# Patient Record
Sex: MALE | Race: WHITE | NOT HISPANIC OR LATINO | Employment: FULL TIME | URBAN - METROPOLITAN AREA
[De-identification: names, ages, dates, MRNs, and addresses within clinical notes are randomized per-mention and may not be internally consistent; named-entity substitution may affect disease eponyms.]

---

## 2017-04-24 ENCOUNTER — OFFICE VISIT (OUTPATIENT)
Dept: FAMILY MEDICINE | Facility: CLINIC | Age: 22
End: 2017-04-24
Payer: COMMERCIAL

## 2017-04-24 VITALS
BODY MASS INDEX: 32.06 KG/M2 | SYSTOLIC BLOOD PRESSURE: 140 MMHG | OXYGEN SATURATION: 98 % | DIASTOLIC BLOOD PRESSURE: 84 MMHG | HEIGHT: 72 IN | WEIGHT: 236.7 LBS | RESPIRATION RATE: 16 BRPM | TEMPERATURE: 97.9 F | HEART RATE: 80 BPM

## 2017-04-24 DIAGNOSIS — G43.009 MIGRAINE WITHOUT AURA AND WITHOUT STATUS MIGRAINOSUS, NOT INTRACTABLE: ICD-10-CM

## 2017-04-24 DIAGNOSIS — G44.309 POST-CONCUSSION HEADACHE: Primary | ICD-10-CM

## 2017-04-24 PROCEDURE — 99214 OFFICE O/P EST MOD 30 MIN: CPT | Performed by: FAMILY MEDICINE

## 2017-04-24 RX ORDER — SUMATRIPTAN 50 MG/1
50 TABLET, FILM COATED ORAL
Qty: 9 TABLET | Refills: 3 | Status: SHIPPED | OUTPATIENT
Start: 2017-04-24 | End: 2017-05-26

## 2017-04-24 ASSESSMENT — PAIN SCALES - GENERAL: PAINLEVEL: NO PAIN (0)

## 2017-04-24 NOTE — PROGRESS NOTES
SUBJECTIVE:                                                    Tj Mobley is a 21 year old male who presents to clinic today for the following health issues:      Headache- history of migraines. Seem to be getting worse. Varies with caffeine use. Had a head injury 2 weeks ago playing Lacrosse then fell again last week landing on his back. Wants to know if this could have contributed to his headaches.      Onset: couple weeks    Description:   Location: bilateral in the frontal area, bilateral in the temporal area, bilateral in the occipital area   Character: throbbing pain, sharp pain, squeezing pain, global  Frequency:  daily  Duration:  2 hours    Intensity: mild, severe    Progression of Symptoms:  worsening    Accompanying Signs & Symptoms:  Stiff neck: YES  Neck or upper back pain: YES- back pain  Fever: no  Sinus pressure: YES  Nausea or vomiting: YES  Dizziness: YES  Numbness: no  Weakness: YES - fatigued  Visual changes: YES   History:   Head trauma: yes- ball hit malissa 2 weeks ago and fell on back last week.   Family history of migraines: YES  Previous tests for headaches: no  Neurologist evaluations: no  Able to do daily activities: YES  Wake with a headaches: YES  Do headaches wake you up: YES- occasional  Daily pain medication use: YES- Tylenol  Work/school stressors/changes: no    Precipitating factors:   Does light make it worse: YES  Does sound make it worse: YES    Alleviating factors:  Does sleep help: YES         Therapies Tried and outcome: Tylenol    Work noon to 6-8 pm. Bed at 1 pm up at 10-11 am. Never noticed triggers unless he is very sleep deprived.       Migraine Follow-Up    Headaches symptoms:  As above    Frequency: once every few weeks     Duration of headaches: 1-2 days    Able to do normal daily activities/work with migraines: Yes    Rescue/Relief medication:ibuprofen (Advil, Motrin) and Tylenol              Effectiveness: moderate relief    Preventative medication:  None    Neurologic complications: No new stroke-like symptoms, loss of vision or speech, numbness or weakness    In the past 4 weeks, how often have you gone to Urgent Care or the emergency room because of your headaches?  0       Problem list and histories reviewed & adjusted, as indicated.  Additional history: as documented    Patient Active Problem List   Diagnosis     Obesity peds (BMI >=95 percentile)     CARDIOVASCULAR SCREENING; LDL GOAL LESS THAN 160     History reviewed. No pertinent surgical history.    Social History   Substance Use Topics     Smoking status: Passive Smoke Exposure - Never Smoker     Smokeless tobacco: Never Used     Alcohol use No     History reviewed. No pertinent family history.      Current Outpatient Prescriptions   Medication Sig Dispense Refill     albuterol (PROAIR HFA) 108 (90 BASE) MCG/ACT inhaler Inhale 2 puffs into the lungs every 6 hours as needed. Use prior to exercise/allergy/URI symptoms. 1 Inhaler 3     [DISCONTINUED] albuterol (PROAIR HFA, PROVENTIL HFA, VENTOLIN HFA) 108 (90 BASE) MCG/ACT inhaler Inhale 2 puffs into the lungs every 6 hours as needed for shortness of breath / dyspnea or wheezing 1 Inhaler 1     Allergies   Allergen Reactions     Penicillins      Sulfa Drugs      Recent Labs   Lab Test  03/15/16   1101   LDL  10   HDL  56   TRIG  132*      BP Readings from Last 3 Encounters:   04/24/17 140/84   10/10/16 128/65   03/15/16 129/80    Wt Readings from Last 3 Encounters:   04/24/17 236 lb 11.2 oz (107.4 kg) (99 %)*   10/10/16 238 lb 6.4 oz (108.1 kg) (99 %)*   03/15/16 240 lb (108.9 kg) (99 %)*     * Growth percentiles are based on CDC 2-20 Years data.                  Labs reviewed in EPIC    Reviewed and updated as needed this visit by clinical staff  Tobacco  Allergies  Meds  Med Hx  Surg Hx  Fam Hx  Soc Hx      Reviewed and updated as needed this visit by Provider         ROS:  Constitutional, HEENT, cardiovascular, pulmonary, gi and gu systems are  negative, except as otherwise noted.    OBJECTIVE:                                                    /84 (BP Location: Right arm, Patient Position: Right side, Cuff Size: Adult Regular)  Pulse 80  Temp 97.9  F (36.6  C) (Oral)  Resp 16  Ht 6' (1.829 m)  Wt 236 lb 11.2 oz (107.4 kg)  SpO2 98%  BMI 32.1 kg/m2  Body mass index is 32.1 kg/(m^2).  GENERAL: healthy, alert, well nourished, well hydrated, no distress, obese  HENT: ear canals- normal; TMs- normal; Nose- normal; Mouth- no ulcers, no lesions, throat is clear with no erythema or exudate.   NECK: no tenderness, no adenopathy, no asymmetry, no masses, no stiffness; thyroid- normal to palpation  RESP: lungs clear to auscultation - no rales, no rhonchi, no wheezes  CV: regular rates and rhythm, normal S1 S2, no S3 or S4 and no murmur, no click or rub -  ABDOMEN: soft, no tenderness, no  hepatosplenomegaly, no masses, normal bowel sounds  MS: extremities- no gross deformities noted, no edema  SKIN: no suspicious lesions, no rashes  NEURO: strength and tone- normal, sensory exam- grossly normal, mentation- intact, speech- normal, reflexes- symmetric  BACK: no CVA tenderness, no paralumbar tenderness  PSYCH: Alert and oriented times 3; speech- coherent , normal rate and volume; able to articulate logical thoughts, able to abstract reason, no tangential thoughts, no hallucinations or delusions, affect- normal           ASSESSMENT/PLAN:                                                        Tobacco Cessation:   reports that he is a non-smoker but has been exposed to tobacco smoke. He has never used smokeless tobacco.      BMI:   Estimated body mass index is 32.1 kg/(m^2) as calculated from the following:    Height as of this encounter: 6' (1.829 m).    Weight as of this encounter: 236 lb 11.2 oz (107.4 kg).   Weight management plan: Discussed healthy diet and exercise guidelines and patient will follow up in 3 months in clinic to re-evaluate.         ICD-10-CM    1. Post-concussion headache G44.309 Discussed risks of head concussion and headache symptoms. No contact sports or Lacrosse for the next months or until headaches are completely resolved. At risk for repeat concussion.    2. Migraine without aura and without status migrainosus, not intractable G43.009 SUMAtriptan (IMITREX) 50 MG tablet- one to two as needed for headaches. Discussed triggers and lifestyle issues. Will go over Migraine action next visit or follow up with primary care provider.       CONSULTATION/REFERRAL to neurology if not improving. Also discussed when to go to emergency department if headaches or symptoms are worse.  FUTURE LABS:       - Schedule fasting labs in a year, then see provider  FUTURE APPOINTMENTS:       - Follow-up visit in 1-2 months or sooner if any questions or concerns.   Work on weight loss  Regular exercise  See Patient Instructions    Ruby Sim MD  [facility]

## 2017-04-24 NOTE — LETTER
[address] [address]  Phone: [phone]    April 24, 2017        Tj Mobley  [address] [place] [place] [address]          To whom it may concern:    RE: Tj Mobley    Patient was seen and treated today at our clinic and missed work 4/24/2017. May go back to work 4-.    Please contact me for questions or concerns.      Sincerely,        Ruby Sim MD

## 2017-04-24 NOTE — NURSING NOTE
Chief Complaint   Patient presents with     Pain     stomach, head       Initial /84 (BP Location: Right arm, Patient Position: Right side, Cuff Size: Adult Regular)  Pulse 80  Temp 97.9  F (36.6  C) (Oral)  Resp 16  Ht 6' (1.829 m)  Wt 236 lb 11.2 oz (107.4 kg)  SpO2 98%  BMI 32.1 kg/m2 Estimated body mass index is 32.1 kg/(m^2) as calculated from the following:    Height as of this encounter: 6' (1.829 m).    Weight as of this encounter: 236 lb 11.2 oz (107.4 kg).  Medication Reconciliation: complete     Jay Abdi MA

## 2017-04-24 NOTE — PATIENT INSTRUCTIONS
What Is Traumatic Brain Injury?  A traumatic brain injury (TBI) is a sudden jolt to your head that changes the way your brain works. The jolt could be caused by a blow to your head, a blast, or an object like a bullet or fragment entering your brain. Falls, fights, combat, sports, and motor vehicle accidents are other common causes. TBI happens more often in men than women and more often under the age of 25.   Types of TBI  A TBI can be mild, moderate, or severe. Most TBIs are mild. Some medical groups refer to a mild TBI as a concussion, while other groups view concussion as a milder subset of TBI. If you have a mild TBI, you might be knocked out for a short time or you might just feel stunned for a while. With a moderate or severe TBI, the duration of loss of consciousness would be longer. Your healthcare team will decide the severity of the TBI based on the symptoms at the time of the trauma as well as afterwards. Often the Kecia Coma Scale (GCS) is used as one tool to classify the severity of TBI. Symptoms of TBI are unpredictable since you could have a mild TBI and actually have more persistent symptoms than someone with a more severe TBI.   Symptoms of TBI  Having a TBI can change your brain in many ways. A TBI can change the way you think, feel, act, and move. The symptoms depend on the part of your brain that is injured. Common symptoms of mild TBI can include:    Headache    Confusion    Loss of consciousness     Dizziness    Blurry vision    Ringing in your ears    Feeling tired    Loss of memory (both before and after the injury)     Mood changes    Trouble sleeping    Being off balance    Being bothered by bright light    Feeling sick to your stomach  Symptoms of moderate or severe TBI may include all the symptoms of a mild TBI as well as any or all of these symptoms:    Extended loss of consciousness     Severe headache that does not go away    Repeated vomiting    Seizures    Extreme  sleepiness    Slurred speech    Weakness and numbness in your arms and legs    Being very clumsy    Being very irritable, restless, or confused  Recovering from TBI  Most people recover completely from a mild TBI. It may take days or weeks. If you have had more than one TBI, your recovery may take longer. Everyone s brain is different, so your recovery time and treatments will depend on how your brain is healing. Here are some tips to help your recovery:    Be honest with your healthcare team and let them know about all your symptoms.    Let your healthcare provider know right away if your symptoms are getting worse or new symptoms appear.     Make sure to keep all your appointments and follow your healthcare provider's instructions carefully.    Give your brain time to heal. Be patient and get plenty of rest.    Don t smoke, take drugs, or drink alcohol.    Don t take any medicines without checking with your healthcare provider first. This includes over-the-counter medicines.    Your healthcare provider might suggest that you take a new medicine (a combination of dextromethorphan and quinidine) if it is difficult controlling your emotions, such as laughing and crying uncontrollably.     Symptoms and recovery from a TBI are unpredictable and are different from person to person. Most people do recover completely from most TBIs. Remember that a TBI can change the way you think, feel, move, and act. The best way to recover is to let your family and healthcare team know about all your symptoms. Work closely with your healthcare team and give your brain time to heal.    8944-9094 [employer]. [address] [place] [address]. All rights reserved. This information is not intended as a substitute for professional medical care. Always follow your healthcare professional's instructions.        Preventing Migraine Headaches: Triggers  The first step in preventing migraines is to learn what triggers  them. You may then be able to control your triggers to avoid or reduce the severity of your migraines.     Know your triggers  Be aware that you may have more than one trigger, and that some triggers may work together. Common migraine triggers include:    Food and nutrition. Skipping meals or not drinking enough water can trigger headaches. So can certain foods, such as caffeine, monosodium glutamate (MSG), aged cheese, or sausage.    Alcohol. Red wine and other alcoholic beverages are common migraine triggers.    Chemicals. Scents, cleaning products, gasoline, glue, perfume, and paint can be triggers. So can tobacco smoke, including secondhand smoke.    Emotions. Stress can trigger headaches or make them worse once they begin.    Sleep disruption. Staying up late, sleeping late, and traveling across time zones can disrupt your sleep cycle, triggering headaches.    Hormones. Many women notice that migraines tend to happen at a certain point in their menstrual cycle. Birth control pills or hormone replacement therapy may also trigger migraines.    Environment and weather. Air travel, changes in altitude, air pressure changes, hot sun, or bright or flashing lights can be triggers.    Control your triggers  These are some of the things you can do to try to control triggers:    Avoid triggers if you can. For example, stay clear of alcohol and foods that trigger your headaches. Use unscented household products. Keep regular sleep habits. Manage stress to help control emotional triggers.    Change your behavior at times when triggers can't be avoided. For example, make sure to get enough rest and drink plenty of water while you're traveling. Make sure to carry a hat, sunglasses, and your medicines. Be alert for migraine symptoms, so you can treat a migraine early if it happens.    1798-2908 [employer]. [address] [place] [id number]. All rights reserved. This information is not intended as a  substitute for professional medical care. Always follow your healthcare professional's instructions.

## 2017-04-24 NOTE — MR AVS SNAPSHOT
After Visit Summary   4/24/2017    Tj Mobley    MRN: [id number]           Patient Information     Date Of Birth          1995        Visit Information        Provider Department      4/24/2017 4:40 PM Ruby Sim MD [facility]        Today's Diagnoses     Post-concussion headache    -  1    Migraine without aura and without status migrainosus, not intractable          Care Instructions      What Is Traumatic Brain Injury?  A traumatic brain injury (TBI) is a sudden jolt to your head that changes the way your brain works. The jolt could be caused by a blow to your head, a blast, or an object like a bullet or fragment entering your brain. Falls, fights, combat, sports, and motor vehicle accidents are other common causes. TBI happens more often in men than women and more often under the age of 25.   Types of TBI  A TBI can be mild, moderate, or severe. Most TBIs are mild. Some medical groups refer to a mild TBI as a concussion, while other groups view concussion as a milder subset of TBI. If you have a mild TBI, you might be knocked out for a short time or you might just feel stunned for a while. With a moderate or severe TBI, the duration of loss of consciousness would be longer. Your healthcare team will decide the severity of the TBI based on the symptoms at the time of the trauma as well as afterwards. Often the Kecia Coma Scale (GCS) is used as one tool to classify the severity of TBI. Symptoms of TBI are unpredictable since you could have a mild TBI and actually have more persistent symptoms than someone with a more severe TBI.   Symptoms of TBI  Having a TBI can change your brain in many ways. A TBI can change the way you think, feel, act, and move. The symptoms depend on the part of your brain that is injured. Common symptoms of mild TBI can include:    Headache    Confusion    Loss of consciousness     Dizziness    Blurry vision    Ringing in your  ears    Feeling tired    Loss of memory (both before and after the injury)     Mood changes    Trouble sleeping    Being off balance    Being bothered by bright light    Feeling sick to your stomach  Symptoms of moderate or severe TBI may include all the symptoms of a mild TBI as well as any or all of these symptoms:    Extended loss of consciousness     Severe headache that does not go away    Repeated vomiting    Seizures    Extreme sleepiness    Slurred speech    Weakness and numbness in your arms and legs    Being very clumsy    Being very irritable, restless, or confused  Recovering from TBI  Most people recover completely from a mild TBI. It may take days or weeks. If you have had more than one TBI, your recovery may take longer. Everyone s brain is different, so your recovery time and treatments will depend on how your brain is healing. Here are some tips to help your recovery:    Be honest with your healthcare team and let them know about all your symptoms.    Let your healthcare provider know right away if your symptoms are getting worse or new symptoms appear.     Make sure to keep all your appointments and follow your healthcare provider's instructions carefully.    Give your brain time to heal. Be patient and get plenty of rest.    Don t smoke, take drugs, or drink alcohol.    Don t take any medicines without checking with your healthcare provider first. This includes over-the-counter medicines.    Your healthcare provider might suggest that you take a new medicine (a combination of dextromethorphan and quinidine) if it is difficult controlling your emotions, such as laughing and crying uncontrollably.     Symptoms and recovery from a TBI are unpredictable and are different from person to person. Most people do recover completely from most TBIs. Remember that a TBI can change the way you think, feel, move, and act. The best way to recover is to let your family and healthcare team know about all your  symptoms. Work closely with your healthcare team and give your brain time to heal.    9238-4874 [employer]. [address] [place] [address], [place] [address]. All rights reserved. This information is not intended as a substitute for professional medical care. Always follow your healthcare professional's instructions.        Preventing Migraine Headaches: Triggers  The first step in preventing migraines is to learn what triggers them. You may then be able to control your triggers to avoid or reduce the severity of your migraines.     Know your triggers  Be aware that you may have more than one trigger, and that some triggers may work together. Common migraine triggers include:    Food and nutrition. Skipping meals or not drinking enough water can trigger headaches. So can certain foods, such as caffeine, monosodium glutamate (MSG), aged cheese, or sausage.    Alcohol. Red wine and other alcoholic beverages are common migraine triggers.    Chemicals. Scents, cleaning products, gasoline, glue, perfume, and paint can be triggers. So can tobacco smoke, including secondhand smoke.    Emotions. Stress can trigger headaches or make them worse once they begin.    Sleep disruption. Staying up late, sleeping late, and traveling across time zones can disrupt your sleep cycle, triggering headaches.    Hormones. Many women notice that migraines tend to happen at a certain point in their menstrual cycle. Birth control pills or hormone replacement therapy may also trigger migraines.    Environment and weather. Air travel, changes in altitude, air pressure changes, hot sun, or bright or flashing lights can be triggers.    Control your triggers  These are some of the things you can do to try to control triggers:    Avoid triggers if you can. For example, stay clear of alcohol and foods that trigger your headaches. Use unscented household products. Keep regular sleep habits. Manage stress to help control emotional  "triggers.    Change your behavior at times when triggers can't be avoided. For example, make sure to get enough rest and drink plenty of water while you're traveling. Make sure to carry a hat, sunglasses, and your medicines. Be alert for migraine symptoms, so you can treat a migraine early if it happens.    4942-8198 The [employer]. [address] [place] [address]. All rights reserved. This information is not intended as a substitute for professional medical care. Always follow your healthcare professional's instructions.              Follow-ups after your visit        Who to contact     If you have questions or need follow up information about today's clinic visit or your schedule please contact [facility] directly at [phone].  Normal or non-critical lab and imaging results will be communicated to you by MyChart, letter or phone within 4 business days after the clinic has received the results. If you do not hear from us within 7 days, please contact the clinic through MyChart or phone. If you have a critical or abnormal lab result, we will notify you by phone as soon as possible.  Submit refill requests through [facility] or call your pharmacy and they will forward the refill request to us. Please allow 3 business days for your refill to be completed.          Additional Information About Your Visit        [employer] Information     [facility] lets you send messages to your doctor, view your test results, renew your prescriptions, schedule appointments and more. To sign up, go to [email or web address] . Click on \"Log in\" on the left side of the screen, which will take you to the Welcome page. Then click on \"Sign up Now\" on the right side of the page.     You will be asked to enter the access code listed below, as well as some personal information. Please follow the directions to create your username and password.     Your access code is: [email or web address]  Expires: 7/23/2017  5:30 " PM     Your access code will  in 90 days. If you need help or a new code, please call your [facility] or [phone].        Care EveryWhere ID     This is your Care EveryWhere ID. This could be used by other organizations to access your [place] medical records  [id number]        Your Vitals Were     Pulse Temperature Respirations Height Pulse Oximetry BMI (Body Mass Index)    80 97.9  F (36.6  C) (Oral) 16 6' (1.829 m) 98% 32.1 kg/m2       Blood Pressure from Last 3 Encounters:   17 140/84   10/10/16 128/65   03/15/16 129/80    Weight from Last 3 Encounters:   17 236 lb 11.2 oz (107.4 kg) (99 %)*   10/10/16 238 lb 6.4 oz (108.1 kg) (99 %)*   03/15/16 240 lb (108.9 kg) (99 %)*     * Growth percentiles are based on [facility] 2-20 Years data.              Today, you had the following     No orders found for display         Today's Medication Changes          These changes are accurate as of: 17  5:30 PM.  If you have any questions, ask your nurse or doctor.               Start taking these medicines.        Dose/Directions    SUMAtriptan 50 MG tablet   Commonly known as:  IMITREX   Used for:  Migraine without aura and without status migrainosus, not intractable   Started by:  Ruby Sim MD        Dose:  50 mg   Take 1 tablet (50 mg) by mouth at onset of headache for migraine May repeat in 2 hours. Max 4 tablets/24 hours.   Quantity:  9 tablet   Refills:  3            Where to get your medicines      These medications were sent to [facility] [id number]  [address] [place] - [address] AT [facility] [address]  [address] [place] [address]     Phone:  [phone]     SUMAtriptan 50 MG tablet                Primary Care Provider Office Phone # Fax #    Lobo Berman [phone]       [facility] [address]  [facility] [place] [address]        Thank you!     Thank you for choosing [facility]  for your care. Our  goal is always to provide you with excellent care. Hearing back from our patients is one way we can continue to improve our services. Please take a few minutes to complete the written survey that you may receive in the mail after your visit with us. Thank you!             Your Updated Medication List - Protect others around you: Learn how to safely use, store and throw away your medicines at www.disposemymeds.org.          This list is accurate as of: 4/24/17  5:30 PM.  Always use your most recent med list.                   Brand Name Dispense Instructions for use    albuterol 108 (90 BASE) MCG/ACT Inhaler   Generic drug:  albuterol     1 Inhaler    Inhale 2 puffs into the lungs every 6 hours as needed. Use prior to exercise/allergy/URI symptoms.       SUMAtriptan 50 MG tablet    IMITREX    9 tablet    Take 1 tablet (50 mg) by mouth at onset of headache for migraine May repeat in 2 hours. Max 4 tablets/24 hours.

## 2017-05-26 ENCOUNTER — OFFICE VISIT (OUTPATIENT)
Dept: FAMILY MEDICINE | Facility: CLINIC | Age: 22
End: 2017-05-26
Payer: COMMERCIAL

## 2017-05-26 VITALS
TEMPERATURE: 97.8 F | HEIGHT: 72 IN | SYSTOLIC BLOOD PRESSURE: 131 MMHG | OXYGEN SATURATION: 98 % | DIASTOLIC BLOOD PRESSURE: 79 MMHG | BODY MASS INDEX: 31.97 KG/M2 | HEART RATE: 102 BPM | WEIGHT: 236 LBS

## 2017-05-26 DIAGNOSIS — E66.811 OBESITY (BMI 30.0-34.9): ICD-10-CM

## 2017-05-26 DIAGNOSIS — G43.009 MIGRAINE WITHOUT AURA AND WITHOUT STATUS MIGRAINOSUS, NOT INTRACTABLE: ICD-10-CM

## 2017-05-26 DIAGNOSIS — F32.1 MAJOR DEPRESSIVE DISORDER, SINGLE EPISODE, MODERATE (H): Primary | ICD-10-CM

## 2017-05-26 DIAGNOSIS — Z23 NEED FOR HPV VACCINATION: ICD-10-CM

## 2017-05-26 PROCEDURE — 90651 9VHPV VACCINE 2/3 DOSE IM: CPT | Performed by: NURSE PRACTITIONER

## 2017-05-26 PROCEDURE — 90471 IMMUNIZATION ADMIN: CPT | Performed by: NURSE PRACTITIONER

## 2017-05-26 PROCEDURE — 99214 OFFICE O/P EST MOD 30 MIN: CPT | Mod: 25 | Performed by: NURSE PRACTITIONER

## 2017-05-26 RX ORDER — CLONAZEPAM 0.5 MG/1
0.25-0.5 TABLET ORAL 2 TIMES DAILY PRN
Qty: 20 TABLET | Refills: 0 | Status: SHIPPED | OUTPATIENT
Start: 2017-05-26 | End: 2018-04-04

## 2017-05-26 ASSESSMENT — PAIN SCALES - GENERAL: PAINLEVEL: NO PAIN (0)

## 2017-05-26 NOTE — NURSING NOTE
Chief Complaint   Patient presents with     Anxiety     Depression       Initial /79  Pulse 102  Temp 97.8  F (36.6  C) (Oral)  Ht 6' (1.829 m)  Wt 236 lb (107 kg)  SpO2 98%  BMI 32.01 kg/m2 Estimated body mass index is 32.01 kg/(m^2) as calculated from the following:    Height as of this encounter: 6' (1.829 m).    Weight as of this encounter: 236 lb (107 kg).  Medication Reconciliation: deanne Gibson MA

## 2017-05-26 NOTE — LETTER
[address] [address]  [phone]    May 26, 2017        Tj Mobley  [address] [place] [address]          To whom it may concern:    This patient missed work  5/26/2017 due to a clinic visit.  He can return to work tomorrow.    Please contact me for questions or concerns.        Sincerely,        Aimee SANTOS, CNP

## 2017-05-26 NOTE — LETTER
My Migraine Action Plan      Date: 5/26/2017     My Name: Tj Mobley   YOB: 1995  My Pharmacy: [facility] [id number] [place], [place] [id number] [facility]  [address]       My (Preventative) Control Medicine:         My Rescue Medicine: Excedrin migraine   My Doctor: Lobo Berman     My Clinic: [address] [id number] [phone]        GREEN ZONE = Good Control    My headache plan is working.   I can do what I need to do.           I WILL:     ? Keep managing my triggers.  ? Write in my migraine diary each time I have a headache.  ? Keep taking my preventive (controller) medicine daily.  ? Take my relief and rescue medicine as needed.             YELLOW ZONE = Not Enough Control    My headache plan isn t always working.   My headaches keep me from doing   some of the things I need to do.       I WILL:     ? Set goals to control my triggers and act on them.  ? Write in my migraine diary each time I have a headache and review it for                      patterns or new triggers.  ? Keep taking my preventive (controller) medicine daily.  ? Take my relief and rescue medicine as needed.  ? Call my doctor or clinic at if I stay in the Yellow Zone.             RED ZONE = Poor or No Control    My headache plan has  failed. I can t do anything  when I have one. My  medicines aren t working.           I WILL:   ? Set goals to control my triggers and act on them.  ? Write in my migraine diary each time I have a headache and review it for                      patterns or new triggers.  ? Keep taking my preventive (controller) medicine daily.  ? Take my relief and rescue medicine as needed.  ? Call my doctor or clinic or go to urgent care or an ER if I m having the worst                  headache of my life.  ? Call my doctor or clinic or go to urgent care or an ER if my medicine doesn t work.  ? Let my doctor or clinic know within  2 weeks if I have gone to an urgent care or             emergency department.          Provider specific instructions:

## 2017-05-26 NOTE — NURSING NOTE
Screening Questionnaire for Adult Immunization    Are you sick today?   No   Do you have allergies to medications, food, a vaccine component or latex?   No   Have you ever had a serious reaction after receiving a vaccination?   No   Do you have a long-term health problem with heart disease, lung disease, asthma, kidney disease, metabolic disease (e.g. diabetes), anemia, or other blood disorder?   No   Do you have cancer, leukemia, HIV/AIDS, or any other immune system problem?   No   In the past 3 months, have you taken medications that affect  your immune system, such as prednisone, other steroids, or anticancer drugs; drugs for the treatment of rheumatoid arthritis, Crohn s disease, or psoriasis; or have you had radiation treatments?   No   Have you had a seizure, or a brain or other nervous system problem?   No   During the past year, have you received a transfusion of blood or blood     products, or been given immune (gamma) globulin or antiviral drug?   No   For women: Are you pregnant or is there a chance you could become        pregnant during the next month?   No   Have you received any vaccinations in the past 4 weeks?   No     Immunization questionnaire answers were all negative.      MNVFC doesn't apply on this patient    Per orders of Aimee Dunlap CNP, injection of HPV given by Irma Cornejo. Patient instructed to remain in clinic for 20 minutes afterwards, and to report any adverse reaction to me immediately.       Screening performed by Irma Cornejo on 5/26/2017 at 5:21 PM.

## 2017-05-26 NOTE — PATIENT INSTRUCTIONS
At [facility], we strive to deliver an exceptional experience to you, every time we see you.    If you receive a survey in the mail, please send us back your thoughts. We really do value your feedback.    Thank you for visiting [facility]    Normal or non-critical lab and imaging results will be communicated to you by MyChart, letter or phone within 7 days.  If you do not hear from us within 10 days, please call the clinic. If you have a critical or abnormal lab result, we will notify you by phone as soon as possible.     If you have any questions regarding your visit please contact:     Team [facility]  Clinic Hours Telephone Number   Dr. Jovani Martinez   7am-7pm  Monday through Thursday  7am-5pm Friday [phone]  Tatiana HARRIS RN   Pharmacy 8:30am-9pm Monday-Friday    9am-5pm Saturday-Sunday [phone]   Urgent Care 11am-9pm Monday-Friday        9am-5pm Saturday-Sunday [phone]     After hours, weekend or if you need to make an appointment with your primary provider please call [phone].   After Hours nurse advise: call [place] [facility]: [phone]    Use [employer] (secure email communication and access to your chart) to send your primary care provider a message or make an appointment. Ask someone on your Team how to sign up for [employer]. To log on to [employer] or for more information in [employer] please visit the website at [email or web address].   As of October 8, 2013, all password changes, disabled accounts, or ID changes in [employer]/MyHealth will be done by our Access Services Department.   If you need help with your account or password, call: [phone]. Clinic staff no longer has the ability to change passwords.     Depression: Tips to Help Yourself  As your health care providers help treat your depression, you can also help yourself. Keep  in mind that your illness affects you emotionally, physically, mentally, and socially. So full recovery will take time. Take care of your body and your soul, and be patient with yourself as you get better.    Be with others  Don t isolate yourself you ll only feel worse. Try to be with other people. And take part in fun activities when you can. Go to a movie, ballgame, Shinto service, or social event. Talk openly with people you can trust. And accept help when it s offered.  Keep your perspective    Depression can cloud your judgment. So wait until you feel better before making major life decisions, such as changing jobs, moving, or getting  or .    This illness is not your fault. Don t blame yourself for your depression.    Recovering from depression is a process. Don t be discouraged if it takes some time to feel better.    Depression saps your energy and concentration. So you won t be able to do all the things you used to do. Set small goals and do what you can.  Take care of your body  People with depression often lose the desire to take care of themselves. That only makes their problems worse. During treatment and afterward, make a point to:    Exercise. It s a great way to take care of your body. And studies have shown that exercise helps fight depression.    Avoid drugs and alcohol. These may ease the pain in the short term. But they ll only make your problems worse in the long run.    Get relief from stress. Ask your healthcare provider for relaxation exercises and techniques to help relieve stress.    Eat right. A balanced and healthy diet helps keep your body healthy.    6035-5288 The [employer]. [address] [place] [address], [place] [address]. All rights reserved. This information is not intended as a substitute for professional medical care. Always follow your healthcare professional's instructions.        Counseling for Depression  Counseling, also called talk therapy, has been  found to be as effective as medication in treating mild to moderate depression. When done by a trained professional, this treatment is a powerful way to better understand your thoughts and feelings. Like medications, it may take time before you notice how much counseling is helping.    Kinds of talk therapy  Different counselors use different methods for talk therapy. But all therapy aims to help change how you think about your problem. Therapy for depression is often done one-on-one. But it may also be done in a group setting. You and your healthcare provider can discuss the type of therapy you think would work best for you. You can also discuss who the best person is to provide the therapy.  How therapy helps  Talking about your problems can help them seem less overwhelming. It can help work through problems you have with your life and your relationships. It can also help you understand how depression is clouding your thinking, not letting you see the world the way it really is. Therapy can give you:    Insight about your emotions.    New tools for dealing with your problems.    Emotional support for making progress.  Getting better takes time  Talk therapy will help you feel better. But change doesn t happen right away. Depression takes away your energy and motivation. So it can be hard to feel like going to therapy and sticking with it. But therapy has been proven to be very valuable in the treatment of depression. Therapy for depression is often done for a set number of sessions. In other cases, you and your therapist decide together at what point you no longer need therapy.  Additional sources of help  In addition to a professional counselor, it may help to talk to other people in your life. You may find support and insight from:    A close friend or family member.    A , , or rabbi trained in counseling.    A local support group or community group.    A 12-step program (such as [employer]) for dealing with problems that can contribute to depression, such as alcohol or drug addiction.    1710-2977 The [employer]. [address] [place] [address], [place] [address]. All rights reserved. This information is not intended as a substitute for professional medical care. Always follow your healthcare professional's instructions.

## 2017-05-26 NOTE — PROGRESS NOTES
SUBJECTIVE:                                                    Tj Mobley is a 21 year old male who presents to clinic today for the following health issues:      Abnormal Mood Symptoms      Duration: x 2 weeks    Description:  Depression: YES  Anxiety: YES  Panic attacks: YES     Accompanying signs and symptoms: see PHQ-9 and ANITHA scores    History (similar episodes/previous evaluation): None    Precipitating or alleviating factors: None    Therapies tried and outcome: Ativan (Lorezepam)   Patient had prescription for #20 Ativan 1/21/176 for anxiety.  His ex girlfriend has started back to work with him at [email or web address], has had issues with co workers.  He has initial insomnia (1-2 hours), has frequent awakenings, daytime somnolence, he snores but denies apneic spells.  He denies history of depression- he dropped all of his classes after he broke up with his girlfriend (her choice) in February.  He has fleeting thoughts of suicide but denies plan or intent.  He moved out of his parents home, lived on his own but had financial concerns, is now living with his Mother again.  He drinks alcohol occasionally- usually 1-2 drinks at a time of eigther Vodka or beer, denies illicit drug use, no supplement use.    Migraines- due for MAP.  He continues to get headaches following concussion sustained in April 2017.  Headaches occur in frontal and temporal region, sometimes in occipital region and last 102 hours, relieved with rest and Excedrin Migraine.  Lack of sleep is a trigger for migraine for him and he is not sleeping well now.  No visual changes, weakness, dizziness, balance problems/abnormal gait.  He is nto taking Imitrex, states Excedrine migraine works beter for him- using it 2-3 times/week.    Problem list and histories reviewed & adjusted, as indicated.  Additional history: as documented    BP Readings from Last 3 Encounters:   05/26/17 131/79   04/24/17 140/84   10/10/16 128/65    Wt Readings from Last 3 Encounters:    05/26/17 236 lb (107 kg) (99 %)*   04/24/17 236 lb 11.2 oz (107.4 kg) (99 %)*   10/10/16 238 lb 6.4 oz (108.1 kg) (99 %)*     * Growth percentiles are based on CDC 2-20 Years data.                  Labs reviewed in EPIC    Reviewed and updated as needed this visit by clinical staff  Tobacco  Allergies  Meds       Reviewed and updated as needed this visit by Provider         ROS:  Constitutional, HEENT, cardiovascular, pulmonary, gi and gu systems are negative, except as otherwise noted.    OBJECTIVE:                                                    /79  Pulse 102  Temp 97.8  F (36.6  C) (Oral)  Ht 6' (1.829 m)  Wt 236 lb (107 kg)  SpO2 98%  BMI 32.01 kg/m2  Body mass index is 32.01 kg/(m^2).  GENERAL: healthy, alert and no distress  EYES: Eyes grossly normal to inspection, PERRL and conjunctivae and sclerae normal  HENT: ear canals and TM's normal, nose and mouth without ulcers or lesions  NECK: no adenopathy, no asymmetry, masses, or scars and thyroid normal to palpation  RESP: lungs clear to auscultation - no rales, rhonchi or wheezes  CV: regular rate and rhythm, normal S1 S2, no S3 or S4, no murmur, click or rub, no peripheral edema and peripheral pulses strong  MS: no gross musculoskeletal defects noted, no edema  SKIN: no suspicious lesions or rashes  PSYCH: mentation appears normal, affect normal/bright    Diagnostic Test Results:  none      ASSESSMENT/PLAN:                                                        BP Screening:   Last 3 BP Readings:    BP Readings from Last 3 Encounters:   05/26/17 131/79   04/24/17 140/84   10/10/16 128/65       The following was recommended to the patient:  Re-screen BP within a year and recommended lifestyle modifications  BMI:   Estimated body mass index is 32.01 kg/(m^2) as calculated from the following:    Height as of this encounter: 6' (1.829 m).    Weight as of this encounter: 236 lb (107 kg).   Weight management plan: Discussed healthy diet and  exercise guidelines and patient will follow up in 12 months in clinic to re-evaluate.      1. Major depressive disorder, single episode, moderate (H)  Referring for counseling as he's struggled with depression for several months, dealing with break up, getting along with co workers.  We'll start Zololft and use Klonopin prn for anxiety/panic attacks until he is therapeutic on the Zoloft. Discussed need for gradual increase of SSRI dose over time, titrating to effect.  Reviewed potential for initial side effects (such as headache, GI symptoms, and dry mouth) that will likely subside after a week or so, but that therapeutic effects will likely take 1-2 weeks - so it's important to stick with medication for at least a month to adequately gauge effect.  Notify me of any significant side effects.  Discussed that treatment with SSRI medications requires a minimum commitment of 9-12 months; shorter courses are associated with rebound symptoms.  Discussed potential long-term side effects including sexual side effects. Return to clinic 3 weeks for medication check, sooner if concerns.    - MENTAL HEALTH REFERRAL  - sertraline (ZOLOFT) 50 MG tablet; Take 1 tablet (50 mg) by mouth daily  Dispense: 30 tablet; Refill: 1  - clonazePAM (KLONOPIN) 0.5 MG tablet; Take 0.5-1 tablets (0.25-0.5 mg) by mouth 2 times daily as needed for anxiety  Dispense: 20 tablet; Refill: 0  - TSH with free T4 reflex; Future    2. Need for HPV vaccination    - HUMAN PAPILLOMA VIRUS (GARDASIL 9) VACCINE  - VACCINE ADMINISTRATION, INITIAL    3. Migraine without aura and without status migrainosus, not intractable  MAP reviewed.  Cautioned patient on use of NSAIDS for more than 3 days/week to avoid rebound headaches.  He is to keep headache log which we'll review at his next visit.    4. Obesity (BMI 30.0-34.9)  Benefits of weight loss reviewed in detail, encouraged him to cut back on the carbohydrates in the diet, consume more fruits and vegetables,  drink plenty of water, avoid fruit juices, sodas, get 150 min moderate exercise/week.  Recheck weight in 6 months.        See Patient Instructions    DANIELLE Aly [facility]

## 2017-05-26 NOTE — MR AVS SNAPSHOT
After Visit Summary   5/26/2017    Tj Mobley    MRN: [id number]           Patient Information     Date Of Birth          1995        Visit Information        Provider Department      5/26/2017 3:40 PM Aimee Dunlap APRN CNP [facility]        Today's Diagnoses     Major depressive disorder, single episode, moderate (H)    -  1    Need for HPV vaccination        Migraine without aura and without status migrainosus, not intractable          Care Instructions    At [facility], we strive to deliver an exceptional experience to you, every time we see you.    If you receive a survey in the mail, please send us back your thoughts. We really do value your feedback.    Thank you for visiting [facility]    Normal or non-critical lab and imaging results will be communicated to you by MyChart, letter or phone within 7 days.  If you do not hear from us within 10 days, please call the clinic. If you have a critical or abnormal lab result, we will notify you by phone as soon as possible.     If you have any questions regarding your visit please contact:     Team Amie/Spirit  Clinic Hours Telephone Number   Dr. Jovani Martinez   7am-7pm  Monday through Thursday  7am-5pm Friday [phone]  Tatiana HARRIS RN   Pharmacy 8:30am-9pm Monday-Friday    9am-5pm Saturday-Sunday [phone]   Urgent Care 11am-9pm Monday-Friday        9am-5pm Saturday-Sunday [phone]     After hours, weekend or if you need to make an appointment with your primary provider please call [phone].   After Hours nurse advise: call [place] [facility]: [phone]    Use Quantaporet (secure email communication and access to your chart) to send your primary care provider a message or make an appointment. Ask someone on your Team how to sign up for Eye-Fi. To log on  to AisleFinder or for more information in [place] please visit the website at [email or web address].   As of October 8, 2013, all password changes, disabled accounts, or ID changes in Incuron/MyHealth will be done by our Access Services Department.   If you need help with your account or password, call: [phone]. Clinic staff no longer has the ability to change passwords.     Depression: Tips to Help Yourself  As your health care providers help treat your depression, you can also help yourself. Keep in mind that your illness affects you emotionally, physically, mentally, and socially. So full recovery will take time. Take care of your body and your soul, and be patient with yourself as you get better.    Be with others  Don t isolate yourself--you ll only feel worse. Try to be with other people. And take part in fun activities when you can. Go to a movie, [employer], Nondenominational service, or social event. Talk openly with people you can trust. And accept help when it s offered.  Keep your perspective    Depression can cloud your judgment. So wait until you feel better before making major life decisions, such as changing jobs, moving, or getting  or .    This illness is not your fault. Don t blame yourself for your depression.    Recovering from depression is a process. Don t be discouraged if it takes some time to feel better.    Depression saps your energy and concentration. So you won t be able to do all the things you used to do. Set small goals and do what you can.  Take care of your body  People with depression often lose the desire to take care of themselves. That only makes their problems worse. During treatment and afterward, make a point to:    Exercise. It s a great way to take care of your body. And studies have shown that exercise helps fight depression.    Avoid drugs and alcohol. These may ease the pain in the short term. But they ll only make your problems worse in the long run.    Get  relief from stress. Ask your healthcare provider for relaxation exercises and techniques to help relieve stress.    Eat right. A balanced and healthy diet helps keep your body healthy.    0467-6434 [employer]. [address] [place] [address], [place] [address]. All rights reserved. This information is not intended as a substitute for professional medical care. Always follow your healthcare professional's instructions.        Counseling for Depression  Counseling, also called talk therapy, has been found to be as effective as medication in treating mild to moderate depression. When done by a trained professional, this treatment is a powerful way to better understand your thoughts and feelings. Like medications, it may take time before you notice how much counseling is helping.    Kinds of talk therapy  Different counselors use different methods for talk therapy. But all therapy aims to help change how you think about your problem. Therapy for depression is often done one-on-one. But it may also be done in a group setting. You and your healthcare provider can discuss the type of therapy you think would work best for you. You can also discuss who the best person is to provide the therapy.  How therapy helps  Talking about your problems can help them seem less overwhelming. It can help work through problems you have with your life and your relationships. It can also help you understand how depression is clouding your thinking, not letting you see the world the way it really is. Therapy can give you:    Insight about your emotions.    New tools for dealing with your problems.    Emotional support for making progress.  Getting better takes time  Talk therapy will help you feel better. But change doesn t happen right away. Depression takes away your energy and motivation. So it can be hard to feel like going to therapy and sticking with it. But therapy has been proven to be very valuable in the treatment of  depression. Therapy for depression is often done for a set number of sessions. In other cases, you and your therapist decide together at what point you no longer need therapy.  Additional sources of help  In addition to a professional counselor, it may help to talk to other people in your life. You may find support and insight from:    A close friend or family member.    A , , or rabbi trained in counseling.    A local support group or community group.    A 12-step program (such as Alcoholics Anonymous) for dealing with problems that can contribute to depression, such as alcohol or drug addiction.    1966-1628 [employer]. [address] [place] [address]. All rights reserved. This information is not intended as a substitute for professional medical care. Always follow your healthcare professional's instructions.                Follow-ups after your visit        Additional Services     MENTAL HEALTH REFERRAL       Your provider has referred you to: [facility]: [facility] - Counseling (Individual/Couples/Family) - [facility] [phone]   [email or web address]/   *Patient will be contacted by [place]'s scheduling partner, [facility] ([facility]), to schedule an appointment.  Patients may also call [facility] to schedule.    All scheduling is subject to the client's specific insurance plan & benefits, provider/location availability, and provider clinical specialities.  Please arrive 15 minutes early for your first appointment and bring your completed paperwork.    Please be aware that coverage of these services is subject to the terms and limitations of your health insurance plan.  Call member services at your health plan with any benefit or coverage questions.                  Who to contact     If you have questions or need follow up information about today's clinic visit or your schedule  "please contact [facility] directly at [phone].  Normal or non-critical lab and imaging results will be communicated to you by MyChart, letter or phone within 4 business days after the clinic has received the results. If you do not hear from us within 7 days, please contact the clinic through MyChart or phone. If you have a critical or abnormal lab result, we will notify you by phone as soon as possible.  Submit refill requests through Sendmebox or call your pharmacy and they will forward the refill request to us. Please allow 3 business days for your refill to be completed.          Additional Information About Your Visit        PantechharRockYou Information     Sendmebox lets you send messages to your doctor, view your test results, renew your prescriptions, schedule appointments and more. To sign up, go to [email or web address] . Click on \"Log in\" on the left side of the screen, which will take you to the Welcome page. Then click on \"Sign up Now\" on the right side of the page.     You will be asked to enter the access code listed below, as well as some personal information. Please follow the directions to create your username and password.     Your access code is: [email or web address]  Expires: 2017  5:30 PM     Your access code will  in 90 days. If you need help or a new code, please call your [facility] clinic or [phone].        Care EveryWhere ID     This is your Care EveryWhere ID. This could be used by other organizations to access your [facility] medical records  [id number]        Your Vitals Were     Pulse Temperature Height Pulse Oximetry BMI (Body Mass Index)       102 97.8  F (36.6  C) (Oral) 6' (1.829 m) 98% 32.01 kg/m2        Blood Pressure from Last 3 Encounters:   17 131/79   17 140/84   10/10/16 128/65    Weight from Last 3 Encounters:   17 236 lb (107 kg) (99 %)*   17 236 lb 11.2 oz (107.4 kg) (99 %)*   10/10/16 238 lb 6.4 oz (108.1 kg) (99 %)*     * " Growth percentiles are based on [facility] 2-20 Years data.              We Performed the Following     HUMAN PAPILLOMA VIRUS (GARDASIL 9) VACCINE     MENTAL HEALTH REFERRAL     MIGRAINE ACTION PLAN          Today's Medication Changes          These changes are accurate as of: 5/26/17  5:15 PM.  If you have any questions, ask your nurse or doctor.               Start taking these medicines.        Dose/Directions    clonazePAM 0.5 MG tablet   Commonly known as:  klonoPIN   Used for:  Major depressive disorder, single episode, moderate (H)   Started by:  Aimee Dunlap APRN CNP        Dose:  0.25-0.5 mg   Take 0.5-1 tablets (0.25-0.5 mg) by mouth 2 times daily as needed for anxiety   Quantity:  20 tablet   Refills:  0       sertraline 50 MG tablet   Commonly known as:  ZOLOFT   Used for:  Major depressive disorder, single episode, moderate (H)   Started by:  Aimee Dunlap APRN CNP        Dose:  50 mg   Take 1 tablet (50 mg) by mouth daily   Quantity:  30 tablet   Refills:  1         Stop taking these medicines if you haven't already. Please contact your care team if you have questions.     albuterol 108 (90 BASE) MCG/ACT Inhaler   Generic drug:  albuterol   Stopped by:  Aimee Dunlap APRN CNP           SUMAtriptan 50 MG tablet   Commonly known as:  IMITREX   Stopped by:  Aimee Dunlap APRN CNP                Where to get your medicines      [facility] [address] [place] [address] [address] [facility] [address]  [address] [address] [place] [address]     Phone:  [phone]     sertraline 50 MG tablet         Some of these will need a paper prescription and others can be bought over the counter.  Ask your nurse if you have questions.     Bring a paper prescription for each of these medications     clonazePAM 0.5 MG tablet                Primary Care Provider Office Phone # Fax #    Lobo Berman [phone]        [facility] [address]  [facility] [address]        Thank you!     Thank you for choosing [facility]  for your care. Our goal is always to provide you with excellent care. Hearing back from our patients is one way we can continue to improve our services. Please take a few minutes to complete the written survey that you may receive in the mail after your visit with us. Thank you!             Your Updated Medication List - Protect others around you: Learn how to safely use, store and throw away your medicines at www.disposemymeds.org.          This list is accurate as of: 5/26/17  5:15 PM.  Always use your most recent med list.                   Brand Name Dispense Instructions for use    clonazePAM 0.5 MG tablet    klonoPIN    20 tablet    Take 0.5-1 tablets (0.25-0.5 mg) by mouth 2 times daily as needed for anxiety       sertraline 50 MG tablet    ZOLOFT    30 tablet    Take 1 tablet (50 mg) by mouth daily

## 2017-05-30 ASSESSMENT — ANXIETY QUESTIONNAIRES
3. WORRYING TOO MUCH ABOUT DIFFERENT THINGS: NEARLY EVERY DAY
5. BEING SO RESTLESS THAT IT IS HARD TO SIT STILL: NEARLY EVERY DAY
6. BECOMING EASILY ANNOYED OR IRRITABLE: NEARLY EVERY DAY
1. FEELING NERVOUS, ANXIOUS, OR ON EDGE: NEARLY EVERY DAY
2. NOT BEING ABLE TO STOP OR CONTROL WORRYING: MORE THAN HALF THE DAYS
7. FEELING AFRAID AS IF SOMETHING AWFUL MIGHT HAPPEN: NEARLY EVERY DAY
GAD7 TOTAL SCORE: 20

## 2017-05-30 ASSESSMENT — PATIENT HEALTH QUESTIONNAIRE - PHQ9: 5. POOR APPETITE OR OVEREATING: NEARLY EVERY DAY

## 2017-05-31 ASSESSMENT — ANXIETY QUESTIONNAIRES: GAD7 TOTAL SCORE: 20

## 2017-05-31 ASSESSMENT — PATIENT HEALTH QUESTIONNAIRE - PHQ9: SUM OF ALL RESPONSES TO PHQ QUESTIONS 1-9: 21

## 2017-08-23 ENCOUNTER — OFFICE VISIT (OUTPATIENT)
Dept: URGENT CARE | Facility: URGENT CARE | Age: 22
End: 2017-08-23
Payer: COMMERCIAL

## 2017-08-23 VITALS
WEIGHT: 223.4 LBS | TEMPERATURE: 98.1 F | BODY MASS INDEX: 30.3 KG/M2 | SYSTOLIC BLOOD PRESSURE: 126 MMHG | HEART RATE: 58 BPM | OXYGEN SATURATION: 97 % | DIASTOLIC BLOOD PRESSURE: 80 MMHG

## 2017-08-23 DIAGNOSIS — R07.0 THROAT PAIN: Primary | ICD-10-CM

## 2017-08-23 DIAGNOSIS — J06.9 VIRAL UPPER RESPIRATORY TRACT INFECTION: ICD-10-CM

## 2017-08-23 LAB
DEPRECATED S PYO AG THROAT QL EIA: NORMAL
SPECIMEN SOURCE: NORMAL

## 2017-08-23 PROCEDURE — 87081 CULTURE SCREEN ONLY: CPT | Performed by: NURSE PRACTITIONER

## 2017-08-23 PROCEDURE — 99213 OFFICE O/P EST LOW 20 MIN: CPT | Performed by: NURSE PRACTITIONER

## 2017-08-23 PROCEDURE — 87880 STREP A ASSAY W/OPTIC: CPT | Performed by: NURSE PRACTITIONER

## 2017-08-23 RX ORDER — FLUTICASONE PROPIONATE 50 MCG
1-2 SPRAY, SUSPENSION (ML) NASAL DAILY
Qty: 1 BOTTLE | Refills: 0 | Status: SHIPPED | OUTPATIENT
Start: 2017-08-23 | End: 2017-09-06

## 2017-08-23 RX ORDER — CETIRIZINE HYDROCHLORIDE 10 MG/1
10 TABLET ORAL EVERY EVENING
Qty: 14 TABLET | Refills: 0 | Status: SHIPPED | OUTPATIENT
Start: 2017-08-23 | End: 2017-09-06

## 2017-08-23 NOTE — LETTER
[facility]  [address]  [facility] [address]  Phone: [phone]    August 23, 2017        Tj Mobley  [address] [place] [address]          To whom it may concern:    RE: Tj Mobley    Patient was seen and treated today at our clinic and missed work.  Patient may return to work 8/24/2017 with no restrictions.    Please contact me for questions or concerns.      Sincerely,        Melanie Ortiz NP

## 2017-08-23 NOTE — PATIENT INSTRUCTIONS
When You Have a Sore Throat    A sore throat can be painful. There are many reasons why you may have a sore throat. Your healthcare provider will work with you to find the cause of your sore throat. He or she will also find the best treatment for you.  What causes a sore throat?  Sore throats can be caused or worsened by:    Cold or flu viruses    Bacteria    Irritants such as tobacco smoke or air pollution    Acid reflux  A healthy throat  The tonsils are on the sides of the throat near the base of the tongue. They collect viruses and bacteria and help fight infection. The throat (pharynx) is the passage for air. Mucus from the nasal cavity also moves down the passage.  An inflamed throat  The tonsils and pharynx can become inflamed due to a cold or flu virus. Postnasal drip (excess mucus draining from the nasal cavity) can irritate the throat. It can also make the throat or tonsils more likely to be infected by bacteria. Severe, untreated tonsillitis in children or adults can cause a pocket of pus (abscess) to form near the tonsil.  Your evaluation  A medical evaluation can help find the cause of your sore throat. It can also help your healthcare provider choose the best treatment for you. The evaluation may include a health history, physical exam, and diagnostic tests.  Health history  Your healthcare provider may ask you the following:    How long has the sore throat lasted and how have you been treating it?    Do you have any other symptoms, such as body aches, fever, or cough?    Does your sore throat recur? If so, how often? How many days of school or work have you missed because of a sore throat?    Do you have trouble eating or swallowing?    Have you been told that you snore or have other sleep problems?    Do you have bad breath?    Do you cough up bad-tasting mucus?  Physical exam  During the exam, your healthcare provider checks your ears, nose, and throat for problems. He or she also checks for  "swelling in the neck, and may listen to your chest.  Possible tests  Other tests your healthcare provider may perform include:    A throat swab to check for bacteria such as streptococcus (the bacteria that causes strep throat)    A blood test to check for mononucleosis (a viral infection)    A chest X-ray to rule out pneumonia, especially if you have a cough  Treating a sore throat  Treatment depends on many factors. What is the likely cause? Is the problem recent? Does it keep coming back? In many cases, the best thing to do is to treat the symptoms, rest, and let the problem heal itself. Antibiotics may help clear up some bacterial infections. For cases of severe or recurring tonsillitis, the tonsils may need to be removed.  Relieving your symptoms    Don t smoke, and avoid secondhand smoke.    For children, try throat sprays or Popsicles. Adults and older children may try lozenges.    Drink warm liquids to soothe the throat and help thin mucus. Avoid alcohol, spicy foods, and acidic drinks such as orange juice. These can irritate the throat.    Gargle with warm saltwater (1 teaspoon of salt to 8 ounces of warm water).    Use a humidifier to keep air moist and relieve throat dryness.    Try over-the-counter pain relievers such as acetaminophen or ibuprofen. Use as directed, and don t exceed the recommended dose. Don t give aspirin to children.   Are antibiotics needed?  If your sore throat is due to a bacterial infection, antibiotics may speed healing and prevent complications. Although group A streptococcus (\"strep throat\" or GAS) is the major treatable infection for a sore throat, GAS causes only 5% to 15% of sore throats in adults who seek medical care. Most sore throats are caused by cold or flu viruses. And antibiotics don t treat viral illness. In fact, using antibiotics when they re not needed may produce bacteria that are harder to kill. Your healthcare provider will prescribe antibiotics only if he or " she thinks they are likely to help.  If antibiotics are prescribed  Take the medicine exactly as directed. Be sure to finish your prescription even if you re feeling better. And be sure to ask your healthcare provider or pharmacist what side effects are common and what to do about them.  Is surgery needed?  In some cases, tonsils need to be removed. This is often done as outpatient (same-day) surgery. Your healthcare provider may advise removing the tonsils in cases of:    Several severe bouts of tonsillitis in a year.  Severe  episodes include those that lead to missed days of school or work, or that need to be treated with antibiotics.    Tonsillitis that causes breathing problems during sleep    Tonsillitis caused by food particles collecting in pouches in the tonsils (cryptic tonsillitis)  Call your healthcare provider if any of the following occur:    Symptoms worsen, or new symptoms develop.    Swollen tonsils make breathing difficult.    The pain is severe enough to keep you from drinking liquids.    A skin rash, hives, or wheezing develops. Any of these could signal an allergic reaction to antibiotics.    Symptoms don t improve within a week.    Symptoms don t improve within 2 to 3 days of starting antibiotics.   Date Last Reviewed: 10/1/2016    9108-3777 [employer]. [address] [place] [address]. All rights reserved. This information is not intended as a substitute for professional medical care. Always follow your healthcare professional's instructions.        Viral Upper Respiratory Illness (Adult)  You have a viral upper respiratory illness (URI), which is another term for the common cold. This illness is contagious during the first few days. It is spread through the air by coughing and sneezing. It may also be spread by direct contact (touching the sick person and then touching your own eyes, nose, or mouth). Frequent handwashing will decrease risk of spread. Most viral  illnesses go away within 7 to 10 days with rest and simple home remedies. Sometimes the illness may last for several weeks. Antibiotics will not kill a virus, and they are generally not prescribed for this condition.    Home care    If symptoms are severe, rest at home for the first 2 to 3 days. When you resume activity, don't let yourself get too tired.    Avoid being exposed to cigarette smoke (yours or others ).    You may use acetaminophen or ibuprofen to control pain and fever, unless another medicine was prescribed. (Note: If you have chronic liver or kidney disease, have ever had a stomach ulcer or gastrointestinal bleeding, or are taking blood-thinning medicines, talk with your healthcare provider before using these medicines.) Aspirin should never be given to anyone under 18 years of age who is ill with a viral infection or fever. It may cause severe liver or brain damage.    Your appetite may be poor, so a light diet is fine. Avoid dehydration by drinking 6 to 8 glasses of fluids per day (water, soft drinks, juices, tea, or soup). Extra fluids will help loosen secretions in the nose and lungs.    Over-the-counter cold medicines will not shorten the length of time you re sick, but they may be helpful for the following symptoms: cough, sore throat, and nasal and sinus congestion. (Note: Do not use decongestants if you have high blood pressure.)  Follow-up care  Follow up with your healthcare provider, or as advised.  When to seek medical advice  Call your healthcare provider right away if any of these occur:    Cough with lots of colored sputum (mucus)    Severe headache; face, neck, or ear pain    Difficulty swallowing due to throat pain    Fever of 100.4 F (38 C)  Call 911, or get immediate medical care  Call emergency services right away if any of these occur:    Chest pain, shortness of breath, wheezing, or difficulty breathing    Coughing up blood    Inability to swallow due to throat pain  Date Last  Reviewed: 9/13/2015 2000-2017 [employer]. [address] [place] [address], [place] [address]. All rights reserved. This information is not intended as a substitute for professional medical care. Always follow your healthcare professional's instructions.

## 2017-08-23 NOTE — PROGRESS NOTES
SUBJECTIVE:   Tj Mobley is a 21 year old male who presents to clinic today for the following health issues:      Sore throat, cough      Duration: 2 days    Description (location/character/radiation): throat    Intensity:  moderate    Accompanying signs and symptoms: sore throat, cough    History (similar episodes/previous evaluation): yes, strep    Precipitating or alleviating factors: None    Therapies tried and outcome: None           Allergies   Allergen Reactions     Penicillins      Sulfa Drugs        Past Medical History:   Diagnosis Date     Asthma          Current Outpatient Prescriptions on File Prior to Visit:  sertraline (ZOLOFT) 50 MG tablet Take 1 tablet (50 mg) by mouth daily   clonazePAM (KLONOPIN) 0.5 MG tablet Take 0.5-1 tablets (0.25-0.5 mg) by mouth 2 times daily as needed for anxiety     No current facility-administered medications on file prior to visit.     Social History   Substance Use Topics     Smoking status: Passive Smoke Exposure - Never Smoker     Smokeless tobacco: Never Used     Alcohol use No       ROS:  Consitutional: As above  ENT: As above  Respiratory: As above    OBJECTIVE:  /80 (BP Location: Left arm, Patient Position: Chair, Cuff Size: Adult Regular)  Pulse 58  Temp 98.1  F (36.7  C) (Oral)  Wt 223 lb 6.4 oz (101.3 kg)  SpO2 97%  BMI 30.3 kg/m2  GENERAL APPEARANCE: healthy, alert and no distress  EYES: conjunctiva clear  EARS:small cerumen.   Ear canals w/o erythema, TM's intact w/o erythema.    NOSE/MOUTH: Nose and mouth without ulcers, erythema or lesions  THROAT: mild erythema with mild tonsillar enlargement . no exudates  NECK: supple, nontender, no lymphadenopathy  RESP: lungs clear to auscultation - no rales, rhonchi or wheezes  CV: regular rates and rhythm, normal S1 S2, no murmur noted  NEURO: awake, alert        Rapid Strep test: Negative    ASSESSMENT:     ICD-10-CM    1. Throat pain R07.0 Strep, Rapid Screen     Beta strep group A culture   2.  Viral upper respiratory tract infection J06.9 cetirizine (ZYRTEC) 10 MG tablet    B97.89 fluticasone (FLONASE) 50 MCG/ACT spray     PLAN:  I discussed lab results with the patient.  Lots of rest and fluids.  RTC if any worsening symptoms or if not improving.    Melanie Ortiz FNP-BC

## 2017-08-23 NOTE — NURSING NOTE
Chief Complaint   Patient presents with     Pharyngitis       Initial /80 (BP Location: Left arm, Patient Position: Chair, Cuff Size: Adult Regular)  Pulse 58  Temp 98.1  F (36.7  C) (Oral)  Wt 223 lb 6.4 oz (101.3 kg)  SpO2 97%  BMI 30.3 kg/m2 Estimated body mass index is 30.3 kg/(m^2) as calculated from the following:    Height as of 5/26/17: 6' (1.829 m).    Weight as of this encounter: 223 lb 6.4 oz (101.3 kg).  Medication Reconciliation: deanne Vásquez

## 2017-08-23 NOTE — MR AVS SNAPSHOT
After Visit Summary   8/23/2017    Tj Mobley    MRN: [id number]           Patient Information     Date Of Birth          1995        Visit Information        Provider Department      8/23/2017 11:40 AM Melanie Ortiz NP [facility]        Today's Diagnoses     Throat pain    -  1    Viral upper respiratory tract infection          Care Instructions      When You Have a Sore Throat    A sore throat can be painful. There are many reasons why you may have a sore throat. Your healthcare provider will work with you to find the cause of your sore throat. He or she will also find the best treatment for you.  What causes a sore throat?  Sore throats can be caused or worsened by:    Cold or flu viruses    Bacteria    Irritants such as tobacco smoke or air pollution    Acid reflux  A healthy throat  The tonsils are on the sides of the throat near the base of the tongue. They collect viruses and bacteria and help fight infection. The throat (pharynx) is the passage for air. Mucus from the nasal cavity also moves down the passage.  An inflamed throat  The tonsils and pharynx can become inflamed due to a cold or flu virus. Postnasal drip (excess mucus draining from the nasal cavity) can irritate the throat. It can also make the throat or tonsils more likely to be infected by bacteria. Severe, untreated tonsillitis in children or adults can cause a pocket of pus (abscess) to form near the tonsil.  Your evaluation  A medical evaluation can help find the cause of your sore throat. It can also help your healthcare provider choose the best treatment for you. The evaluation may include a health history, physical exam, and diagnostic tests.  Health history  Your healthcare provider may ask you the following:    How long has the sore throat lasted and how have you been treating it?    Do you have any other symptoms, such as body aches, fever, or cough?    Does your sore throat recur? If so, how  "often? How many days of school or work have you missed because of a sore throat?    Do you have trouble eating or swallowing?    Have you been told that you snore or have other sleep problems?    Do you have bad breath?    Do you cough up bad-tasting mucus?  Physical exam  During the exam, your healthcare provider checks your ears, nose, and throat for problems. He or she also checks for swelling in the neck, and may listen to your chest.  Possible tests  Other tests your healthcare provider may perform include:    A throat swab to check for bacteria such as streptococcus (the bacteria that causes strep throat)    A blood test to check for mononucleosis (a viral infection)    A chest X-ray to rule out pneumonia, especially if you have a cough  Treating a sore throat  Treatment depends on many factors. What is the likely cause? Is the problem recent? Does it keep coming back? In many cases, the best thing to do is to treat the symptoms, rest, and let the problem heal itself. Antibiotics may help clear up some bacterial infections. For cases of severe or recurring tonsillitis, the tonsils may need to be removed.  Relieving your symptoms    Don t smoke, and avoid secondhand smoke.    For children, try throat sprays or Popsicles. Adults and older children may try lozenges.    Drink warm liquids to soothe the throat and help thin mucus. Avoid alcohol, spicy foods, and acidic drinks such as orange juice. These can irritate the throat.    Gargle with warm saltwater (1 teaspoon of salt to 8 ounces of warm water).    Use a humidifier to keep air moist and relieve throat dryness.    Try over-the-counter pain relievers such as acetaminophen or ibuprofen. Use as directed, and don t exceed the recommended dose. Don t give aspirin to children.   Are antibiotics needed?  If your sore throat is due to a bacterial infection, antibiotics may speed healing and prevent complications. Although group A streptococcus (\"strep throat\" or " GAS) is the major treatable infection for a sore throat, GAS causes only 5% to 15% of sore throats in adults who seek medical care. Most sore throats are caused by cold or flu viruses. And antibiotics don t treat viral illness. In fact, using antibiotics when they re not needed may produce bacteria that are harder to kill. Your healthcare provider will prescribe antibiotics only if he or she thinks they are likely to help.  If antibiotics are prescribed  Take the medicine exactly as directed. Be sure to finish your prescription even if you re feeling better. And be sure to ask your healthcare provider or pharmacist what side effects are common and what to do about them.  Is surgery needed?  In some cases, tonsils need to be removed. This is often done as outpatient (same-day) surgery. Your healthcare provider may advise removing the tonsils in cases of:    Several severe bouts of tonsillitis in a year.  Severe  episodes include those that lead to missed days of school or work, or that need to be treated with antibiotics.    Tonsillitis that causes breathing problems during sleep    Tonsillitis caused by food particles collecting in pouches in the tonsils (cryptic tonsillitis)  Call your healthcare provider if any of the following occur:    Symptoms worsen, or new symptoms develop.    Swollen tonsils make breathing difficult.    The pain is severe enough to keep you from drinking liquids.    A skin rash, hives, or wheezing develops. Any of these could signal an allergic reaction to antibiotics.    Symptoms don t improve within a week.    Symptoms don t improve within 2 to 3 days of starting antibiotics.   Date Last Reviewed: 10/1/2016    2115-8091 [facility]. [address] [place] [address], [place] [address]. All rights reserved. This information is not intended as a substitute for professional medical care. Always follow your healthcare professional's instructions.        Viral Upper Respiratory Illness  (Adult)  You have a viral upper respiratory illness (URI), which is another term for the common cold. This illness is contagious during the first few days. It is spread through the air by coughing and sneezing. It may also be spread by direct contact (touching the sick person and then touching your own eyes, nose, or mouth). Frequent handwashing will decrease risk of spread. Most viral illnesses go away within 7 to 10 days with rest and simple home remedies. Sometimes the illness may last for several weeks. Antibiotics will not kill a virus, and they are generally not prescribed for this condition.    Home care    If symptoms are severe, rest at home for the first 2 to 3 days. When you resume activity, don't let yourself get too tired.    Avoid being exposed to cigarette smoke (yours or others ).    You may use acetaminophen or ibuprofen to control pain and fever, unless another medicine was prescribed. (Note: If you have chronic liver or kidney disease, have ever had a stomach ulcer or gastrointestinal bleeding, or are taking blood-thinning medicines, talk with your healthcare provider before using these medicines.) Aspirin should never be given to anyone under 18 years of age who is ill with a viral infection or fever. It may cause severe liver or brain damage.    Your appetite may be poor, so a light diet is fine. Avoid dehydration by drinking 6 to 8 glasses of fluids per day (water, soft drinks, juices, tea, or soup). Extra fluids will help loosen secretions in the nose and lungs.    Over-the-counter cold medicines will not shorten the length of time you re sick, but they may be helpful for the following symptoms: cough, sore throat, and nasal and sinus congestion. (Note: Do not use decongestants if you have high blood pressure.)  Follow-up care  Follow up with your healthcare provider, or as advised.  When to seek medical advice  Call your healthcare provider right away if any of these occur:    Cough with lots  "of colored sputum (mucus)    Severe headache; face, neck, or ear pain    Difficulty swallowing due to throat pain    Fever of 100.4 F (38 C)  Call 911, or get immediate medical care  Call emergency services right away if any of these occur:    Chest pain, shortness of breath, wheezing, or difficulty breathing    Coughing up blood    Inability to swallow due to throat pain  Date Last Reviewed: 9/13/2015 2000-2017 [employer]. [address] [place] [address], [place] [address]. All rights reserved. This information is not intended as a substitute for professional medical care. Always follow your healthcare professional's instructions.                Follow-ups after your visit        Who to contact     If you have questions or need follow up information about today's clinic visit or your schedule please contact [facility] directly at [phone].  Normal or non-critical lab and imaging results will be communicated to you by Kodkodhart, letter or phone within 4 business days after the clinic has received the results. If you do not hear from us within 7 days, please contact the clinic through Kodkodhart or phone. If you have a critical or abnormal lab result, we will notify you by phone as soon as possible.  Submit refill requests through PageScience or call your pharmacy and they will forward the refill request to us. Please allow 3 business days for your refill to be completed.          Additional Information About Your Visit        KodkodharCorrelec Information     PageScience lets you send messages to your doctor, view your test results, renew your prescriptions, schedule appointments and more. To sign up, go to [email or web address] . Click on \"Log in\" on the left side of the screen, which will take you to the Welcome page. Then click on \"Sign up Now\" on the right side of the page.     You will be asked to enter the access code listed below, as well as some personal information. Please follow the directions " to create your username and password.     Your access code is: [email or web address]  Expires: 2017 12:28 PM     Your access code will  in 90 days. If you need help or a new code, please call your [facility] or [phone].        Care EveryWhere ID     This is your Care EveryWhere ID. This could be used by other organizations to access your [place] medical records  [id number]        Your Vitals Were     Pulse Temperature Pulse Oximetry BMI (Body Mass Index)          58 98.1  F (36.7  C) (Oral) 97% 30.3 kg/m2         Blood Pressure from Last 3 Encounters:   17 126/80   17 131/79   17 140/84    Weight from Last 3 Encounters:   17 223 lb 6.4 oz (101.3 kg) (97 %)*   17 236 lb (107 kg) (99 %)*   17 236 lb 11.2 oz (107.4 kg) (99 %)*     * Growth percentiles are based on [facility] 2-20 Years data.              We Performed the Following     Strep, Rapid Screen          Today's Medication Changes          These changes are accurate as of: 17 12:28 PM.  If you have any questions, ask your nurse or doctor.               Start taking these medicines.        Dose/Directions    cetirizine 10 MG tablet   Commonly known as:  zyrTEC   Used for:  Viral upper respiratory tract infection   Started by:  Melanie Ortiz NP        Dose:  10 mg   Take 1 tablet (10 mg) by mouth every evening for 14 days   Quantity:  14 tablet   Refills:  0       fluticasone 50 MCG/ACT spray   Commonly known as:  FLONASE   Used for:  Viral upper respiratory tract infection   Started by:  Melanie Ortiz NP        Dose:  1-2 spray   Spray 1-2 sprays into both nostrils daily for 14 days   Quantity:  1 Bottle   Refills:  0            Where to get your medicines      These medications were sent to [facility]  [place] - [address] AT [facility] [address] [place] [address]     Phone:  [phone]     cetirizine 10 MG tablet    fluticasone 50 MCG/ACT spray                 Primary Care Provider Office Phone # Fax #    Lobo Berman [phone]       [address]  [facility] [address]        Equal Access to Services     SEJAL HELTON : Hadii ambika jacobo akilao Soadnreyali, waaxda luqadaha, qaybta kaalmada adeegyada, beto christianson laTitoteri fox. So [facility] [phone].    ATENCIÓN: Si habla español, tiene a plascencia disposición servicios gratuitos de asistencia lingüística. Llame al [phone].    We comply with applicable federal civil rights laws and [place] laws. We do not discriminate on the basis of race, color, national origin, age, disability sex, sexual orientation or gender identity.            Thank you!     Thank you for choosing [facility]  for your care. Our goal is always to provide you with excellent care. Hearing back from our patients is one way we can continue to improve our services. Please take a few minutes to complete the written survey that you may receive in the mail after your visit with us. Thank you!             Your Updated Medication List - Protect others around you: Learn how to safely use, store and throw away your medicines at www.disposemymeds.org.          This list is accurate as of: 8/23/17 12:28 PM.  Always use your most recent med list.                   Brand Name Dispense Instructions for use Diagnosis    cetirizine 10 MG tablet    zyrTEC    14 tablet    Take 1 tablet (10 mg) by mouth every evening for 14 days    Viral upper respiratory tract infection       clonazePAM 0.5 MG tablet    klonoPIN    20 tablet    Take 0.5-1 tablets (0.25-0.5 mg) by mouth 2 times daily as needed for anxiety    Major depressive disorder, single episode, moderate (H)       fluticasone 50 MCG/ACT spray    FLONASE    1 Bottle    Spray 1-2 sprays into both nostrils daily for 14 days    Viral upper respiratory tract infection       sertraline 50 MG tablet    ZOLOFT    30 tablet    Take 1 tablet (50 mg) by mouth daily     Major depressive disorder, single episode, moderate (H)

## 2017-08-24 LAB
BACTERIA SPEC CULT: NORMAL
SPECIMEN SOURCE: NORMAL

## 2017-08-28 ENCOUNTER — OFFICE VISIT (OUTPATIENT)
Dept: URGENT CARE | Facility: URGENT CARE | Age: 22
End: 2017-08-28
Payer: COMMERCIAL

## 2017-08-28 VITALS
WEIGHT: 219 LBS | OXYGEN SATURATION: 99 % | BODY MASS INDEX: 29.7 KG/M2 | SYSTOLIC BLOOD PRESSURE: 135 MMHG | HEART RATE: 73 BPM | DIASTOLIC BLOOD PRESSURE: 82 MMHG | TEMPERATURE: 98.5 F

## 2017-08-28 DIAGNOSIS — J98.01 BRONCHOSPASM: ICD-10-CM

## 2017-08-28 DIAGNOSIS — J06.9 VIRAL URI WITH COUGH: Primary | ICD-10-CM

## 2017-08-28 PROCEDURE — 99213 OFFICE O/P EST LOW 20 MIN: CPT | Performed by: FAMILY MEDICINE

## 2017-08-28 RX ORDER — ALBUTEROL SULFATE 90 UG/1
2-4 AEROSOL, METERED RESPIRATORY (INHALATION) EVERY 4 HOURS PRN
Qty: 1 INHALER | Refills: 2 | Status: SHIPPED | OUTPATIENT
Start: 2017-08-28 | End: 2018-07-19

## 2017-08-28 NOTE — PROGRESS NOTES
Some of this note was populated by a medical assistant.     SUBJECTIVE:   Tj Mobley is a 21 year old male who presents to clinic today for the following health issues:    ENT Symptoms             Symptoms: cc Present Absent Comment   Fever/Chills  x  Chills x1 week   Fatigue  x  x1 week   Muscle Aches  x  x1 week   Eye Irritation  x  Watery eyes x1 week   Sneezing  x  x1 week   Nasal Earle/Drg  x  Runny nose x1 week   Sinus Pressure/Pain   x    Loss of smell   x    Dental pain  x  Upper and lower dental pain x1 week   Sore Throat  x  Sore throat.  Pt in clinic last week.    Swollen Glands   x    Ear Pain/Fullness   x    Cough  x  Cough x2 weeks   Wheeze       Chest Pain x   Chest pain, tightness and burning today   Shortness of breath  x  today   Rash   x    Other   x      Symptom duration:  2 weeks   Symptom severity:  moderate   Treatments tried:  none   Contacts:  none       Mild asthma when younger although not needed his inhaler in a few years.   Working in the house cleaning business.   Anxiety in the past will other work. Stopped zoloft 2 months ago after a two week taper as was feeling.   Does take clonazepam on occasion on avg. 3 x per month on avg   Does not have a hx of suicidal attempts, rare fleeting thoughts many months ago with stress.     Problem list and histories reviewed & adjusted, as indicated.  Additional history: none    Patient Active Problem List   Diagnosis     Obesity peds (BMI >=95 percentile)     CARDIOVASCULAR SCREENING; LDL GOAL LESS THAN 160     Migraine without aura and without status migrainosus, not intractable     Post-concussion headache     Obesity (BMI 30.0-34.9)     No past surgical history on file.    Social History   Substance Use Topics     Smoking status: Passive Smoke Exposure - Never Smoker     Smokeless tobacco: Never Used     Alcohol use No     No family history on file.      Current Outpatient Prescriptions   Medication Sig Dispense Refill     cetirizine (ZYRTEC)  10 MG tablet Take 1 tablet (10 mg) by mouth every evening for 14 days 14 tablet 0     fluticasone (FLONASE) 50 MCG/ACT spray Spray 1-2 sprays into both nostrils daily for 14 days 1 Bottle 0     sertraline (ZOLOFT) 50 MG tablet Take 1 tablet (50 mg) by mouth daily 30 tablet 1     clonazePAM (KLONOPIN) 0.5 MG tablet Take 0.5-1 tablets (0.25-0.5 mg) by mouth 2 times daily as needed for anxiety 20 tablet 0     Allergies   Allergen Reactions     Penicillins      Sulfa Drugs          Reviewed and updated as needed this visit by [facility] [place]  Allergies  Meds       Reviewed and updated as needed this visit by Provider         ROS:  Constitutional, HEENT, cardiovascular, pulmonary, gi and gu systems are negative, except as otherwise noted.    OBJECTIVE:   /82 (BP Location: Left arm, Patient Position: Chair)  Pulse 73  Temp 98.5  F (36.9  C) (Oral)  Wt 219 lb (99.3 kg)  SpO2 99%  BMI 29.7 kg/m2  Body mass index is 29.7 kg/(m^2).  GENERAL: healthy, alert and no distress  NECK: no adenopathy, no asymmetry, masses, or scars and thyroid normal to palpation  Sinus congestion noted with inflammation.   RESP: lungs clear to auscultation - no rales, rhonchi or wheezes  CV: regular rate and rhythm, normal S1 S2, no S3 or S4, no murmur, click or rub, no peripheral edema and peripheral pulses strong  ABDOMEN: soft, nontender, no hepatosplenomegaly, no masses and bowel sounds normal  MS: no gross musculoskeletal defects noted, no edema    Diagnostic Test Results:  none     ASSESSMENT/PLAN:       ICD-10-CM    1. Viral URI with cough J06.9     B97.89    2. Bronchospasm J98.01         PLAN  Trial of OTC cough medicine and rest.   Prn albuterol as above. Indications for immediate follow-up were emphasized and written.   Patient educational/instructional material provided including reasons for follow-up   The patient indicates understanding of these issues and agrees with the plan.  Matt Monge MD        [employer]  [facility]

## 2017-08-28 NOTE — NURSING NOTE
Initial /82 (BP Location: Left arm, Patient Position: Chair)  Pulse 73  Temp 98.5  F (36.9  C) (Oral)  Wt 219 lb (99.3 kg)  SpO2 99%  BMI 29.7 kg/m2 Estimated body mass index is 29.7 kg/(m^2) as calculated from the following:    Height as of 5/26/17: 6' (1.829 m).    Weight as of this encounter: 219 lb (99.3 kg). .

## 2017-08-28 NOTE — MR AVS SNAPSHOT
"              After Visit Summary   8/28/2017    Tj Mobley    MRN: [id number]           Patient Information     Date Of Birth          1995        Visit Information        Provider Department      8/28/2017 4:30 PM Matt Monge MD [facility]        Today's Diagnoses     Viral URI with cough    -  1    Bronchospasm           Follow-ups after your visit        Your next 10 appointments already scheduled     Aug 29, 2017  6:40 PM CDT   Office Visit with Scar Velazco MD   [facility]    [address] [place] [address] [phone]           Bring a current list of meds and any records pertaining to this visit. For Physicals, please bring immunization records and any forms needing to be filled out. Please arrive 10 minutes early to complete paperwork.              Who to contact     If you have questions or need follow up information about today's clinic visit or your schedule please contact [facility] directly at [phone].  Normal or non-critical lab and imaging results will be communicated to you by MyChart, letter or phone within 4 business days after the clinic has received the results. If you do not hear from us within 7 days, please contact the clinic through GetPromotdhart or phone. If you have a critical or abnormal lab result, we will notify you by phone as soon as possible.  Submit refill requests through HeyBubble or call your pharmacy and they will forward the refill request to us. Please allow 3 business days for your refill to be completed.          Additional Information About Your Visit        MyChart Information     HeyBubble lets you send messages to your doctor, view your test results, renew your prescriptions, schedule appointments and more. To sign up, go to [email or web address] . Click on \"Log in\" on the left side of the screen, which will take you to the Welcome " "page. Then click on \"Sign up Now\" on the right side of the page.     You will be asked to enter the access code listed below, as well as some personal information. Please follow the directions to create your username and password.     Your access code is: [email or web address]  Expires: 2017 12:28 PM     Your access code will  in 90 days. If you need help or a new code, please call your [facility] clinic or [phone].        Care EveryWhere ID     This is your Care EveryWhere ID. This could be used by other organizations to access your [facility] medical records  [id number]        Your Vitals Were     Pulse Temperature Pulse Oximetry BMI (Body Mass Index)          73 98.5  F (36.9  C) (Oral) 99% 29.7 kg/m2         Blood Pressure from Last 3 Encounters:   17 135/82   17 126/80   17 131/79    Weight from Last 3 Encounters:   17 219 lb (99.3 kg) (97 %)*   17 223 lb 6.4 oz (101.3 kg) (97 %)*   17 236 lb (107 kg) (99 %)*     * Growth percentiles are based on CDC 2-20 Years data.              Today, you had the following     No orders found for display         Today's Medication Changes          These changes are accurate as of: 17  5:32 PM.  If you have any questions, ask your nurse or doctor.               Start taking these medicines.        Dose/Directions    albuterol 108 (90 BASE) MCG/ACT Inhaler   Commonly known as:  PROAIR HFA/PROVENTIL HFA/VENTOLIN HFA   Used for:  Viral URI with cough, Bronchospasm   Started by:  Matt Monge MD        Dose:  2-4 puff   Inhale 2-4 puffs into the lungs every 4 hours as needed for shortness of breath / dyspnea or wheezing   Quantity:  1 Inhaler   Refills:  2            Where to get your medicines      These medications were sent to [facility] [place] - [address] [facility] [address] [place] [address]     Phone:  [phone]     albuterol 108 (90 BASE) MCG/ACT " Inhaler                Primary Care Provider Office Phone # Fax #    Lobo Berman [phone]       [address]  [facility] [address]        Equal Access to Services     SEJAL HELTON : Hadii ambika ku hadkhushbuo Soomaali, waaxda luqadaha, qaybta kaalmada adeegyada, beto lazaroteri fox. So [facility] [phone].    ATENCIÓN: Si habla español, tiene a plascencia disposición servicios gratuitos de asistencia lingüística. Llame al [phone].    We comply with applicable federal civil rights laws and [place] laws. We do not discriminate on the basis of race, color, national origin, age, disability sex, sexual orientation or gender identity.            Thank you!     Thank you for choosing [facility]  for your care. Our goal is always to provide you with excellent care. Hearing back from our patients is one way we can continue to improve our services. Please take a few minutes to complete the written survey that you may receive in the mail after your visit with us. Thank you!             Your Updated Medication List - Protect others around you: Learn how to safely use, store and throw away your medicines at www.disposemymeds.org.          This list is accurate as of: 8/28/17  5:32 PM.  Always use your most recent med list.                   Brand Name Dispense Instructions for use Diagnosis    albuterol 108 (90 BASE) MCG/ACT Inhaler    PROAIR HFA/PROVENTIL HFA/VENTOLIN HFA    1 Inhaler    Inhale 2-4 puffs into the lungs every 4 hours as needed for shortness of breath / dyspnea or wheezing    Viral URI with cough, Bronchospasm       cetirizine 10 MG tablet    zyrTEC    14 tablet    Take 1 tablet (10 mg) by mouth every evening for 14 days    Viral upper respiratory tract infection       clonazePAM 0.5 MG tablet    klonoPIN    20 tablet    Take 0.5-1 tablets (0.25-0.5 mg) by mouth 2 times daily as needed for anxiety    Major depressive disorder, single episode, moderate (H)        fluticasone 50 MCG/ACT spray    FLONASE    1 Bottle    Spray 1-2 sprays into both nostrils daily for 14 days    Viral upper respiratory tract infection

## 2017-10-05 ENCOUNTER — OFFICE VISIT (OUTPATIENT)
Dept: FAMILY MEDICINE | Facility: CLINIC | Age: 22
End: 2017-10-05
Payer: COMMERCIAL

## 2017-10-05 VITALS
TEMPERATURE: 98.6 F | OXYGEN SATURATION: 99 % | SYSTOLIC BLOOD PRESSURE: 138 MMHG | HEART RATE: 86 BPM | BODY MASS INDEX: 30.35 KG/M2 | DIASTOLIC BLOOD PRESSURE: 81 MMHG | WEIGHT: 223.8 LBS

## 2017-10-05 DIAGNOSIS — R30.0 DYSURIA: Primary | ICD-10-CM

## 2017-10-05 DIAGNOSIS — Z23 NEED FOR PROPHYLACTIC VACCINATION AND INOCULATION AGAINST INFLUENZA: ICD-10-CM

## 2017-10-05 LAB
ALBUMIN UR-MCNC: NEGATIVE MG/DL
APPEARANCE UR: CLEAR
BILIRUB UR QL STRIP: NEGATIVE
COLOR UR AUTO: YELLOW
GLUCOSE UR STRIP-MCNC: NEGATIVE MG/DL
HGB UR QL STRIP: NEGATIVE
KETONES UR STRIP-MCNC: NEGATIVE MG/DL
LEUKOCYTE ESTERASE UR QL STRIP: NEGATIVE
NITRATE UR QL: NEGATIVE
PH UR STRIP: 6 PH (ref 5–7)
RBC #/AREA URNS AUTO: NORMAL /HPF
SOURCE: NORMAL
SP GR UR STRIP: <=1.005 (ref 1–1.03)
UROBILINOGEN UR STRIP-ACNC: 0.2 EU/DL (ref 0.2–1)
WBC #/AREA URNS AUTO: NORMAL /HPF

## 2017-10-05 PROCEDURE — 87491 CHLMYD TRACH DNA AMP PROBE: CPT | Performed by: PHYSICIAN ASSISTANT

## 2017-10-05 PROCEDURE — 87591 N.GONORRHOEAE DNA AMP PROB: CPT | Performed by: PHYSICIAN ASSISTANT

## 2017-10-05 PROCEDURE — 99213 OFFICE O/P EST LOW 20 MIN: CPT | Performed by: PHYSICIAN ASSISTANT

## 2017-10-05 PROCEDURE — 81001 URINALYSIS AUTO W/SCOPE: CPT | Performed by: PHYSICIAN ASSISTANT

## 2017-10-05 RX ORDER — DOXYCYCLINE 100 MG/1
100 CAPSULE ORAL 2 TIMES DAILY
Qty: 14 CAPSULE | Refills: 0 | Status: SHIPPED | OUTPATIENT
Start: 2017-10-05 | End: 2017-10-12

## 2017-10-05 NOTE — NURSING NOTE
Chief Complaint   Patient presents with     Urinary Problem       Initial /81 (BP Location: Left arm, Patient Position: Chair, Cuff Size: Adult Large)  Pulse 86  Temp 98.6  F (37  C) (Oral)  Wt 223 lb 12.8 oz (101.5 kg)  SpO2 99%  BMI 30.35 kg/m2 Estimated body mass index is 30.35 kg/(m^2) as calculated from the following:    Height as of 5/26/17: 6' (1.829 m).    Weight as of this encounter: 223 lb 12.8 oz (101.5 kg).  Medication Reconciliation: complete   Osiel Patel/MA

## 2017-10-05 NOTE — PATIENT INSTRUCTIONS
Doxycycline 100 mg twice a day for 7 day will cover for potential STD and UTI  Understanding Urinary Tract Infections (UTIs)  Most UTIs are caused by bacteria, although they may also be caused by viruses or fungi. Bacteria from the bowel are the most common source of infection. The infection may start because of any of the following:    Sexual activity. During sex, bacteria can travel from the penis, vagina, or rectum into the urethra.     Bacteria on the skin outside the rectum may travel into the urethra. This is more common in women since the rectum and urethra are closer to each other than in men. Wiping from front to back after using the toilet and keeping the area clean can help prevent germs from getting to the urethra.    Blockage of urine flow through the urinary tract. If urine sits too long, germs may start to grow out of control.      Parts of the urinary tract  The infection can occur in any part of the urinary tract.    The kidneys collect and store urine.    The ureters carry urine from the kidneys to the bladder.    The bladder holds urine until you are ready to let it out.    The urethra carries urine from the bladder out of the body. It is shorter in women, so bacteria can move through it more easily. The urethra is longer in men, so a UTI is less likely to reach the bladder or kidneys in men.  Date Last Reviewed: 1/1/2017 2000-2017 [employer]. [address] [place] [address], [place] [address]. All rights reserved. This information is not intended as a substitute for professional medical care. Always follow your healthcare professional's instructions.

## 2017-10-05 NOTE — MR AVS SNAPSHOT
After Visit Summary   10/5/2017    Tj Mobley    MRN: [id number]           Patient Information     Date Of Birth          1995        Visit Information        Provider Department      10/5/2017 3:00 PM Janessa Shaw PA-C [facility]        Today's Diagnoses     Dysuria    -  1    Need for prophylactic vaccination and inoculation against influenza          Care Instructions    Doxycycline 100 mg twice a day for 7 day will cover for potential STD and UTI  Understanding Urinary Tract Infections (UTIs)  Most UTIs are caused by bacteria, although they may also be caused by viruses or fungi. Bacteria from the bowel are the most common source of infection. The infection may start because of any of the following:    Sexual activity. During sex, bacteria can travel from the penis, vagina, or rectum into the urethra.     Bacteria on the skin outside the rectum may travel into the urethra. This is more common in women since the rectum and urethra are closer to each other than in men. Wiping from front to back after using the toilet and keeping the area clean can help prevent germs from getting to the urethra.    Blockage of urine flow through the urinary tract. If urine sits too long, germs may start to grow out of control.      Parts of the urinary tract  The infection can occur in any part of the urinary tract.    The kidneys collect and store urine.    The ureters carry urine from the kidneys to the bladder.    The bladder holds urine until you are ready to let it out.    The urethra carries urine from the bladder out of the body. It is shorter in women, so bacteria can move through it more easily. The urethra is longer in men, so a UTI is less likely to reach the bladder or kidneys in men.  Date Last Reviewed: 1/1/2017 2000-2017 [employer]. [address] [place] [address], [place] [address]. All rights reserved. This information is not intended as a  "substitute for professional medical care. Always follow your healthcare professional's instructions.                Follow-ups after your visit        Who to contact     If you have questions or need follow up information about today's clinic visit or your schedule please contact [facility] directly at [phone].  Normal or non-critical lab and imaging results will be communicated to you by MyChart, letter or phone within 4 business days after the clinic has received the results. If you do not hear from us within 7 days, please contact the clinic through Flexiroamhart or phone. If you have a critical or abnormal lab result, we will notify you by phone as soon as possible.  Submit refill requests through Sympler or call your pharmacy and they will forward the refill request to us. Please allow 3 business days for your refill to be completed.          Additional Information About Your Visit        MyChart Information     Sympler lets you send messages to your doctor, view your test results, renew your prescriptions, schedule appointments and more. To sign up, go to [email or web address] . Click on \"Log in\" on the left side of the screen, which will take you to the Welcome page. Then click on \"Sign up Now\" on the right side of the page.     You will be asked to enter the access code listed below, as well as some personal information. Please follow the directions to create your username and password.     Your access code is: [email or web address]  Expires: 2017 12:28 PM     Your access code will  in 90 days. If you need help or a new code, please call your [facility] or [phone].        Care EveryWhere ID     This is your Care EveryWhere ID. This could be used by other organizations to access your [facility] medical records  [id number]        Your Vitals Were     Pulse Temperature Pulse Oximetry BMI (Body Mass Index)          86 98.6  F (37  C) (Oral) 99% 30.35 kg/m2         Blood Pressure " from Last 3 Encounters:   10/05/17 138/81   08/28/17 135/82   08/23/17 126/80    Weight from Last 3 Encounters:   10/05/17 223 lb 12.8 oz (101.5 kg) (97 %)*   08/28/17 219 lb (99.3 kg) (97 %)*   08/23/17 223 lb 6.4 oz (101.3 kg) (97 %)*     * Growth percentiles are based on CDC 2-20 Years data.              We Performed the Following     CHLAMYDIA TRACHOMATIS PCR     NEISSERIA GONORRHOEA PCR     UA with Microscopic          Today's Medication Changes          These changes are accurate as of: 10/5/17  4:01 PM.  If you have any questions, ask your nurse or doctor.               Start taking these medicines.        Dose/Directions    doxycycline Monohydrate 100 MG Caps   Used for:  Dysuria   Started by:  Janessa Shaw PA-C        Dose:  100 mg   Take 1 capsule (100 mg) by mouth 2 times daily for 7 days   Quantity:  14 capsule   Refills:  0            Where to get your medicines      These medications were sent to [facility] [address] [place] [address] [address] [facility] [address]  [address], [place] [address]     Phone:  [phone]     doxycycline Monohydrate 100 MG Caps                Primary Care Provider Office Phone # Fax #    Lobo Berman [phone]       [address]  [facility] [address]        Equal Access to Services     [facility] AH: Hadii aad ku hadasho Soomaali, waaxda luqadaha, qaybta kaalmada adeegyada, beto garcia . So [facility] [phone].    ATENCIÓN: Si habla español, tiene a plascencia disposición servicios gratuitos de asistencia lingüística. Llame al [phone].    We comply with applicable federal civil rights laws and [place] laws. We do not discriminate on the basis of race, color, national origin, age, disability, sex, sexual orientation, or gender identity.            Thank you!     Thank you for choosing [facility]  for your care. Our goal is always to provide you with  excellent care. Hearing back from our patients is one way we can continue to improve our services. Please take a few minutes to complete the written survey that you may receive in the mail after your visit with us. Thank you!             Your Updated Medication List - Protect others around you: Learn how to safely use, store and throw away your medicines at www.disposemymeds.org.          This list is accurate as of: 10/5/17  4:01 PM.  Always use your most recent med list.                   Brand Name Dispense Instructions for use Diagnosis    albuterol 108 (90 BASE) MCG/ACT Inhaler    PROAIR HFA/PROVENTIL HFA/VENTOLIN HFA    1 Inhaler    Inhale 2-4 puffs into the lungs every 4 hours as needed for shortness of breath / dyspnea or wheezing    Viral URI with cough, Bronchospasm       clonazePAM 0.5 MG tablet    klonoPIN    20 tablet    Take 0.5-1 tablets (0.25-0.5 mg) by mouth 2 times daily as needed for anxiety    Major depressive disorder, single episode, moderate (H)       doxycycline Monohydrate 100 MG Caps     14 capsule    Take 1 capsule (100 mg) by mouth 2 times daily for 7 days    Dysuria

## 2017-10-05 NOTE — PROGRESS NOTES
SUBJECTIVE:   Tj Mobley is a 21 year old male who presents to clinic today for the following health issues:      Genitourinary - Male  Onset: 2 days ago    Description:   Dysuria (painful urination): YES  Hematuria (blood in urine): no   Frequency: YES  Are you urinating at night : YES  Hesitancy (delay in urine): YES  Retention (unable to empty): YES  Decrease in urinary flow: no   Incontinence: no     Progression of Symptoms:  same    Accompanying Signs & Symptoms:  Fever: no   Back/Flank pain: YES  Urethral discharge: no   Testicle lumps/masses/pain: no   Nausea and/or vomiting: no   Abdominal pain: no     History:   History of frequent UTI's: no   History of kidney stones: no   History of hernias: no   Personal or Family history of Prostate problems: no  Sexually active: YES    Precipitating factors:   none    Alleviating factors:  none    Problem list and histories reviewed & adjusted, as indicated.  Additional history: as documented    Patient Active Problem List   Diagnosis     Obesity peds (BMI >=95 percentile)     CARDIOVASCULAR SCREENING; LDL GOAL LESS THAN 160     Migraine without aura and without status migrainosus, not intractable     Post-concussion headache     Obesity (BMI 30.0-34.9)     No past surgical history on file.    Social History   Substance Use Topics     Smoking status: Passive Smoke Exposure - Never Smoker     Smokeless tobacco: Never Used     Alcohol use No     No family history on file.      Current Outpatient Prescriptions   Medication Sig Dispense Refill     doxycycline Monohydrate 100 MG CAPS Take 1 capsule (100 mg) by mouth 2 times daily for 7 days 14 capsule 0     albuterol (PROAIR HFA/PROVENTIL HFA/VENTOLIN HFA) 108 (90 BASE) MCG/ACT Inhaler Inhale 2-4 puffs into the lungs every 4 hours as needed for shortness of breath / dyspnea or wheezing 1 Inhaler 2     clonazePAM (KLONOPIN) 0.5 MG tablet Take 0.5-1 tablets (0.25-0.5 mg) by mouth 2 times daily as needed for anxiety 20  tablet 0     Allergies   Allergen Reactions     Penicillins      Sulfa Drugs          Reviewed and updated as needed this visit by clinical staff       Reviewed and updated as needed this visit by Provider         ROS:  Constitutional, HEENT, cardiovascular, pulmonary, gi and gu systems are negative, except as otherwise noted.      OBJECTIVE:   /81 (BP Location: Left arm, Patient Position: Chair, Cuff Size: Adult Large)  Pulse 86  Temp 98.6  F (37  C) (Oral)  Wt 223 lb 12.8 oz (101.5 kg)  SpO2 99%  BMI 30.35 kg/m2  Body mass index is 30.35 kg/(m^2).  GENERAL: healthy, alert and no distress  NECK: no adenopathy, no asymmetry, masses, or scars and thyroid normal to palpation  RESP: lungs clear to auscultation - no rales, rhonchi or wheezes  CV: regular rate and rhythm, normal S1 S2, no S3 or S4, no murmur, click or rub, no peripheral edema and peripheral pulses strong  ABDOMEN: soft, nontender, no hepatosplenomegaly, no masses and bowel sounds normal  MS: no gross musculoskeletal defects noted, no edema  BACK: no CVA tenderness, no paralumbar tenderness    Diagnostic Test Results:  Results for orders placed or performed in visit on 10/05/17 (from the past 24 hour(s))   UA with Microscopic   Result Value Ref Range    Color Urine Yellow     Appearance Urine Clear     Glucose Urine Negative NEG^Negative mg/dL    Bilirubin Urine Negative NEG^Negative    Ketones Urine Negative NEG^Negative mg/dL    Specific Gravity Urine <=1.005 1.003 - 1.035    pH Urine 6.0 5.0 - 7.0 pH    Protein Albumin Urine Negative NEG^Negative mg/dL    Urobilinogen Urine 0.2 0.2 - 1.0 EU/dL    Nitrite Urine Negative NEG^Negative    Blood Urine Negative NEG^Negative    Leukocyte Esterase Urine Negative NEG^Negative    Source Midstream Urine     WBC Urine PENDING OTO2^O - 2 /HPF    RBC Urine PENDING OTO2^O - 2 /HPF       ASSESSMENT/PLAN:       ICD-10-CM    1. Dysuria R30.0 UA with Microscopic     NEISSERIA GONORRHOEA PCR     CHLAMYDIA  TRACHOMATIS PCR     doxycycline Monohydrate 100 MG CAPS   2. Need for prophylactic vaccination and inoculation against influenza Z23      urinalysis is negative   STD are pending  Patient will be treated with Doxycycline 100 mg twice a day for 7 days to cover for potential STD.       Janessa Shaw PA-C  [facility]

## 2017-10-06 LAB
C TRACH DNA SPEC QL NAA+PROBE: POSITIVE
N GONORRHOEA DNA SPEC QL NAA+PROBE: NEGATIVE
SPECIMEN SOURCE: ABNORMAL
SPECIMEN SOURCE: NORMAL

## 2017-10-06 NOTE — PROGRESS NOTES
Please call the patient with these results:      Chlamydia was positive. This an STD. Doxycyline that patient is taking will take care of it. Please discuss this your partner so she can get tested and treated as well. No sex until done with treatment.     Amber Shaw PAC

## 2017-10-13 ENCOUNTER — OFFICE VISIT (OUTPATIENT)
Dept: FAMILY MEDICINE | Facility: CLINIC | Age: 22
End: 2017-10-13
Payer: COMMERCIAL

## 2017-10-13 VITALS
OXYGEN SATURATION: 98 % | WEIGHT: 227 LBS | DIASTOLIC BLOOD PRESSURE: 79 MMHG | TEMPERATURE: 98.3 F | HEART RATE: 77 BPM | SYSTOLIC BLOOD PRESSURE: 127 MMHG | BODY MASS INDEX: 30.79 KG/M2

## 2017-10-13 DIAGNOSIS — F41.9 ANXIETY: ICD-10-CM

## 2017-10-13 DIAGNOSIS — A74.9 CHLAMYDIA INFECTION: Primary | ICD-10-CM

## 2017-10-13 DIAGNOSIS — F32.A DEPRESSION, UNSPECIFIED DEPRESSION TYPE: ICD-10-CM

## 2017-10-13 PROCEDURE — 99213 OFFICE O/P EST LOW 20 MIN: CPT | Performed by: PHYSICIAN ASSISTANT

## 2017-10-13 RX ORDER — ALPRAZOLAM 0.25 MG
0.25 TABLET ORAL 3 TIMES DAILY PRN
Qty: 15 TABLET | Refills: 0 | Status: SHIPPED | OUTPATIENT
Start: 2017-10-13 | End: 2018-04-04

## 2017-10-13 RX ORDER — AZITHROMYCIN 500 MG/1
1000 TABLET, FILM COATED ORAL ONCE
Qty: 2 TABLET | Refills: 0 | Status: SHIPPED | OUTPATIENT
Start: 2017-10-13 | End: 2017-10-13

## 2017-10-13 RX ORDER — NORTRIPTYLINE HCL 25 MG
CAPSULE ORAL
Qty: 30 CAPSULE | Refills: 1 | Status: SHIPPED | OUTPATIENT
Start: 2017-10-13 | End: 2018-04-04

## 2017-10-13 ASSESSMENT — PATIENT HEALTH QUESTIONNAIRE - PHQ9
5. POOR APPETITE OR OVEREATING: MORE THAN HALF THE DAYS
SUM OF ALL RESPONSES TO PHQ QUESTIONS 1-9: 20

## 2017-10-13 ASSESSMENT — ANXIETY QUESTIONNAIRES
3. WORRYING TOO MUCH ABOUT DIFFERENT THINGS: MORE THAN HALF THE DAYS
6. BECOMING EASILY ANNOYED OR IRRITABLE: MORE THAN HALF THE DAYS
IF YOU CHECKED OFF ANY PROBLEMS ON THIS QUESTIONNAIRE, HOW DIFFICULT HAVE THESE PROBLEMS MADE IT FOR YOU TO DO YOUR WORK, TAKE CARE OF THINGS AT HOME, OR GET ALONG WITH OTHER PEOPLE: VERY DIFFICULT
7. FEELING AFRAID AS IF SOMETHING AWFUL MIGHT HAPPEN: MORE THAN HALF THE DAYS
GAD7 TOTAL SCORE: 14
1. FEELING NERVOUS, ANXIOUS, OR ON EDGE: NEARLY EVERY DAY
2. NOT BEING ABLE TO STOP OR CONTROL WORRYING: MORE THAN HALF THE DAYS
5. BEING SO RESTLESS THAT IT IS HARD TO SIT STILL: SEVERAL DAYS

## 2017-10-13 NOTE — PROGRESS NOTES
SUBJECTIVE:   Tj Mobley is a 21 year old male who presents to clinic today for the following health issues:      Anxiety Follow-Up    Status since last visit: Worsened     Other associated symptoms:SOB, bad mood    Complicating factors:   Significant life event: Yes-  work   Current substance abuse: None  Depression symptoms: Yes  ANITHA-7 SCORE 5/30/2017   Total Score 20     Took zoloft for about 1 week and caused stomach ache and nausea in may.not sleeping well.    GAD7     dx with chlamydia, did not complete the doxy.                Allergies   Allergen Reactions     Penicillins      Sulfa Drugs        Past Medical History:   Diagnosis Date     Asthma          Current Outpatient Prescriptions on File Prior to Visit:  albuterol (PROAIR HFA/PROVENTIL HFA/VENTOLIN HFA) 108 (90 BASE) MCG/ACT Inhaler Inhale 2-4 puffs into the lungs every 4 hours as needed for shortness of breath / dyspnea or wheezing   clonazePAM (KLONOPIN) 0.5 MG tablet Take 0.5-1 tablets (0.25-0.5 mg) by mouth 2 times daily as needed for anxiety     No current facility-administered medications on file prior to visit.     Social History   Substance Use Topics     Smoking status: Passive Smoke Exposure - Never Smoker     Smokeless tobacco: Never Used     Alcohol use No       ROS:   GEN no fevers   + chlamydia as above  PSYCH anxiety and depression    OBJECTIVE:  /79 (BP Location: Left arm, Patient Position: Chair, Cuff Size: Adult Large)  Pulse 77  Temp 98.3  F (36.8  C) (Oral)  Wt 227 lb (103 kg)  SpO2 98%  BMI 30.79 kg/m2   General:   awake, alert, and cooperative.  NAD.   Head: Normocephalic, atraumatic.  Eyes: Conjunctiva clear,      Lungs: regular rate  Neuro: Alert and oriented - normal speech.  P{SYCH; responsive affect, good insight.no SI/HI, no tangential speech    ASSESSMENT:well appearing    ICD-10-CM    1. Chlamydia infection A74.9 azithromycin (ZITHROMAX) 500 MG tablet   2. Depression, unspecified depression type F32.9  MENTAL HEALTH REFERRAL     nortriptyline (PAMELOR) 25 MG capsule   3. Anxiety F41.9 ALPRAZolam (XANAX) 0.25 MG tablet       PLAN:   Follow up:  1-2 months or prn  Advised about symptoms which might herald more serious problems.

## 2017-10-13 NOTE — MR AVS SNAPSHOT
After Visit Summary   10/13/2017    Tj Mobley    MRN: [id number]           Patient Information     Date Of Birth          1995        Visit Information        Provider Department      10/13/2017 11:00 AM Jagdish Hill PA [facility]        Today's Diagnoses     Chlamydia infection    -  1    Depression, unspecified depression type        Anxiety          Care Instructions      Chlamydia  Chlamydia is a very common sexually transmitted disease (STD). Most people do not have symptoms. Because of this, chlamydia may not be noticed until it causes severe problems. Left untreated, this infection can cause women and men to become sterile. This means they will not be able to have children.     Symptoms  Many people with chlamydia have no symptoms. Women are more likely than men not to have symptoms.  If symptoms show up in women, they include:    Yellow discharge (fluid) from the vagina or anus    Bleeding between periods    Pain or burning during urination  If symptoms show up in men, they include:    Clear discharge (drip) from the penis or anus    Pain or burning during urination  Potential Problems  If the infection is not treated, it can lead to more serious health problems. In women, this can be pelvic inflammatory disease (PID). PID can make a woman sterile. It can also cause an ectopic (tubal) pregnancy. This type of pregnancy cannot be carried to term. Symptoms of PID include fever, pain during sex, and pain in the abdomen.  Women should get checked for chlamydia regularly. This can help prevent PID.   Treatment  When found early, chlamydia can be treated. It can be cured with antibiotic medications. If you have it, tell your partner right away. Because women often don t have symptoms, men should ask their partners to get tested.  Prevention  Know your partner s history. Protect yourself by using a latex condom whenever you have sex. If you are pregnant, take  extra care. Untreated chlamydia in a pregnant woman can cause eye, ear, or lung problems in the baby.  Resources  American Social Health Association STD Hotline  480.639.9652  www.ashastd.org  Centers for Disease Control and Prevention  517.340.7686  www.cdc.gov/std     8159-2655 Sophia Guevara, [address] [place] [address], [place] [address]. All rights reserved. This information is not intended as a substitute for professional medical care. Always follow your healthcare professional's instructions.      Depression  Depression is one of the most common mental health problems today. It is not just a state of unhappiness or sadness. It is a true disease. The cause seems to be related to a decrease in chemicals that transmit signals in the brain. Having a family history of depression, alcoholism, or suicide increases the risk. Chronic illness, chronic pain, migraine headaches and high emotional stress also increase the risk.  Depression is something we tend to recognize in others, but may have a hard time seeing in ourselves. It can show in many physical and emotional ways:    Loss of appetite    Over-eating    Not being able to sleep    Sleeping too much    Tiredness not related to physical exertion    Restlessness or irritability    Slowness of movement or speech    Feeling depressed or withdrawn    Loss of interest in things you once enjoyed    Trouble concentrating, poor memory, trouble making decisions    Thoughts of harming or killing oneself, or thoughts that life is not worth living    Low self-esteem  The treatment for depression may include both medicine and psychotherapy. Antidepressants can reduce suffering and can improve the ability to function during the depressed period. Therapy can offer emotional support and help you understand emotional factors that may be causing the depression.  Home care    On-going care and support helps people manage this disease.  Find a healthcare provider and therapist who meet  your needs. Seek help when you feel like you may be getting ill.    Be kind to yourself. Make it a point to do things that you enjoy (gardening, walking in nature, going to a movie, etc.). Reward yourself for small successes.    Take care of your physical body. Eat a balanced diet (low in saturated fat and high in fruits and vegetables). Exercise at least 3 times a week for 30 minutes. Even mild-moderate exercise (like brisk walking) can make you feel better.    Avoid alcohol, which can make depression worse.    Take medicine as prescribed.    Tell each of your healthcare providers about all of the prescription drugs, over-the-counter medicines, vitamins, and supplements you take. Certain supplements interact with medicines and can result in dangerous side effects. Ask your pharmacist when you have questions about drug interactions.    Talk with your family and trusted friends about your feelings and thoughts. Ask them to help you recognize behavior changes early so you can get help and, if needed, medicine can be adjusted.  Follow-up care  Follow up with your healthcare provider, or as advised.  Call 911  Call 911 if you:    Have suicidal thoughts, a suicide plan, and the means to carry out the plan    Have trouble breathing    Are very confused    Feel very drowsy or have trouble awakening    Faint or lose consciousness    Have new chest pain that becomes more severe, lasts longer, or spreads into your shoulder, arm, neck, jaw or back  When to seek medical advice  Call your healthcare provider right away if any of these occur:    Feeling extreme depression, fear, anxiety, or anger toward yourself or others    Feeling out of control    Feeling that you may try to harm yourself or another    Hearing voices that others do not hear    Seeing things that others do not see    Can t sleep or eat for 3 days in a row    Friends or family express concern over your behavior and ask you to seek help  Date Last Reviewed:  9/29/2015 2000-2017 [employer]. [address] [place] [address], [place] [address]. All rights reserved. This information is not intended as a substitute for professional medical care. Always follow your healthcare professional's instructions.                Follow-ups after your visit        Additional Services     MENTAL HEALTH REFERRAL       Your provider has referred you to: [facility]: [facility] - Counseling (Individual/Couples/Family) - [facility] [phone]   [email or web address]/   *Patient will be contacted by [facility] scheduling partner, [facility]), to schedule an appointment.  Patients may also call [facility] to schedule.    All scheduling is subject to the client's specific insurance plan & benefits, provider/location availability, and provider clinical specialities.  Please arrive 15 minutes early for your first appointment and bring your completed paperwork.    Please be aware that coverage of these services is subject to the terms and limitations of your health insurance plan.  Call member services at your health plan with any benefit or coverage questions.                  Follow-up notes from your care team     Return if symptoms worsen or fail to improve.      Who to contact     If you have questions or need follow up information about today's clinic visit or your schedule please contact [facility] directly at [phone].  Normal or non-critical lab and imaging results will be communicated to you by MyChart, letter or phone within 4 business days after the clinic has received the results. If you do not hear from us within 7 days, please contact the clinic through MyChart or phone. If you have a critical or abnormal lab result, we will notify you by phone as soon as possible.  Submit refill requests through [employer] or call your pharmacy and they will forward  "the refill request to us. Please allow 3 business days for your refill to be completed.          Additional Information About Your Visit        [employer] Information     [employer] lets you send messages to your doctor, view your test results, renew your prescriptions, schedule appointments and more. To sign up, go to [email or web address] . Click on \"Log in\" on the left side of the screen, which will take you to the Welcome page. Then click on \"Sign up Now\" on the right side of the page.     You will be asked to enter the access code listed below, as well as some personal information. Please follow the directions to create your username and password.     Your access code is: [email or web address]  Expires: 2017 12:28 PM     Your access code will  in 90 days. If you need help or a new code, please call your [place] clinic or [phone].        Care EveryWhere ID     This is your Care EveryWhere ID. This could be used by other organizations to access your [place] medical records  [id number]        Your Vitals Were     Pulse Temperature Pulse Oximetry BMI (Body Mass Index)          77 98.3  F (36.8  C) (Oral) 98% 30.79 kg/m2         Blood Pressure from Last 3 Encounters:   10/13/17 127/79   10/05/17 138/81   17 135/82    Weight from Last 3 Encounters:   10/13/17 227 lb (103 kg) (98 %)*   10/05/17 223 lb 12.8 oz (101.5 kg) (97 %)*   17 219 lb (99.3 kg) (97 %)*     * Growth percentiles are based on [facility] 2-20 Years data.              We Performed the Following     MENTAL HEALTH REFERRAL          Today's Medication Changes          These changes are accurate as of: 10/13/17 11:15 AM.  If you have any questions, ask your nurse or doctor.               Start taking these medicines.        Dose/Directions    ALPRAZolam 0.25 MG tablet   Commonly known as:  XANAX   Used for:  Anxiety   Started by:  Jagdish Hill PA        Dose:  0.25 mg   Take 1 tablet (0.25 mg) by mouth 3 times daily as needed " for anxiety   Quantity:  15 tablet   Refills:  0       azithromycin 500 MG tablet   Commonly known as:  ZITHROMAX   Used for:  Chlamydia infection   Started by:  Jagdish Hill PA        Dose:  1000 mg   Take 2 tablets (1,000 mg) by mouth once for 1 dose   Quantity:  2 tablet   Refills:  0       nortriptyline 25 MG capsule   Commonly known as:  PAMELOR   Used for:  Depression, unspecified depression type   Started by:  Jagdish Hill PA        Start 1/2 tab PO qhs for 7 days then 1 tab qhs   Quantity:  30 capsule   Refills:  1            Where to get your medicines      These medications were sent to [facility] [place] - [place], [place] - [address]  [address], [facility] [address]     Phone:  [phone]     azithromycin 500 MG tablet    nortriptyline 25 MG capsule         Some of these will need a paper prescription and others can be bought over the counter.  Ask your nurse if you have questions.     Bring a paper prescription for each of these medications     ALPRAZolam 0.25 MG tablet                Primary Care Provider Office Phone # Fax #    Lobo Berman [phone]       [address]  [facility] [address]        Equal Access to Services     DIOGO HELTON AH: Hadii ambika ku hadasho Soomaali, waaxda luqadaha, qaybta kaalmada adeegyada, beto fox. So [facility] [phone].    ATENCIÓN: Si habla español, tiene a plascencia disposición servicios gratuitos de asistencia lingüística. Llame al [phone].    We comply with applicable federal civil rights laws and [place] laws. We do not discriminate on the basis of race, color, national origin, age, disability, sex, sexual orientation, or gender identity.            Thank you!     Thank you for choosing [facility]  for your care. Our goal is always to provide you with excellent care. Hearing back from our patients is one way we can continue to improve  our services. Please take a few minutes to complete the written survey that you may receive in the mail after your visit with us. Thank you!             Your Updated Medication List - Protect others around you: Learn how to safely use, store and throw away your medicines at www.disposemymeds.org.          This list is accurate as of: 10/13/17 11:15 AM.  Always use your most recent med list.                   Brand Name Dispense Instructions for use Diagnosis    albuterol 108 (90 BASE) MCG/ACT Inhaler    PROAIR HFA/PROVENTIL HFA/VENTOLIN HFA    1 Inhaler    Inhale 2-4 puffs into the lungs every 4 hours as needed for shortness of breath / dyspnea or wheezing    Viral URI with cough, Bronchospasm       ALPRAZolam 0.25 MG tablet    XANAX    15 tablet    Take 1 tablet (0.25 mg) by mouth 3 times daily as needed for anxiety    Anxiety       azithromycin 500 MG tablet    ZITHROMAX    2 tablet    Take 2 tablets (1,000 mg) by mouth once for 1 dose    Chlamydia infection       clonazePAM 0.5 MG tablet    klonoPIN    20 tablet    Take 0.5-1 tablets (0.25-0.5 mg) by mouth 2 times daily as needed for anxiety    Major depressive disorder, single episode, moderate (H)       nortriptyline 25 MG capsule    PAMELOR    30 capsule    Start 1/2 tab PO qhs for 7 days then 1 tab qhs    Depression, unspecified depression type

## 2017-10-13 NOTE — NURSING NOTE
Chief Complaint   Patient presents with     Anxiety     wants to talk about other options     Results     need medication        Initial /79 (BP Location: Left arm, Patient Position: Chair, Cuff Size: Adult Large)  Pulse 77  Temp 98.3  F (36.8  C) (Oral)  Wt 227 lb (103 kg)  SpO2 98%  BMI 30.79 kg/m2 Estimated body mass index is 30.79 kg/(m^2) as calculated from the following:    Height as of 5/26/17: 6' (1.829 m).    Weight as of this encounter: 227 lb (103 kg).  Medication Reconciliation: complete   Osiel Patel MA

## 2017-10-13 NOTE — PATIENT INSTRUCTIONS
Chlamydia  Chlamydia is a very common sexually transmitted disease (STD). Most people do not have symptoms. Because of this, chlamydia may not be noticed until it causes severe problems. Left untreated, this infection can cause women and men to become sterile. This means they will not be able to have children.     Symptoms  Many people with chlamydia have no symptoms. Women are more likely than men not to have symptoms.  If symptoms show up in women, they include:    Yellow discharge (fluid) from the vagina or anus    Bleeding between periods    Pain or burning during urination  If symptoms show up in men, they include:    Clear discharge (drip) from the penis or anus    Pain or burning during urination  Potential Problems  If the infection is not treated, it can lead to more serious health problems. In women, this can be pelvic inflammatory disease (PID). PID can make a woman sterile. It can also cause an ectopic (tubal) pregnancy. This type of pregnancy cannot be carried to term. Symptoms of PID include fever, pain during sex, and pain in the abdomen.  Women should get checked for chlamydia regularly. This can help prevent PID.   Treatment  When found early, chlamydia can be treated. It can be cured with antibiotic medications. If you have it, tell your partner right away. Because women often don t have symptoms, men should ask their partners to get tested.  Prevention  Know your partner s history. Protect yourself by using a latex condom whenever you have sex. If you are pregnant, take extra care. Untreated chlamydia in a pregnant woman can cause eye, ear, or lung problems in the baby.  Resources  American Social Health Association STD Hotline  959.719.1793  www.ashastd.org  Centers for Disease Control and Prevention  745.236.7876  www.cdc.gov/std     8898-8803 Sophia Guevara, [address] [place] [address], [place] [address]. All rights reserved. This information is not intended as a substitute for professional  medical care. Always follow your healthcare professional's instructions.      Depression  Depression is one of the most common mental health problems today. It is not just a state of unhappiness or sadness. It is a true disease. The cause seems to be related to a decrease in chemicals that transmit signals in the brain. Having a family history of depression, alcoholism, or suicide increases the risk. Chronic illness, chronic pain, migraine headaches and high emotional stress also increase the risk.  Depression is something we tend to recognize in others, but may have a hard time seeing in ourselves. It can show in many physical and emotional ways:    Loss of appetite    Over-eating    Not being able to sleep    Sleeping too much    Tiredness not related to physical exertion    Restlessness or irritability    Slowness of movement or speech    Feeling depressed or withdrawn    Loss of interest in things you once enjoyed    Trouble concentrating, poor memory, trouble making decisions    Thoughts of harming or killing oneself, or thoughts that life is not worth living    Low self-esteem  The treatment for depression may include both medicine and psychotherapy. Antidepressants can reduce suffering and can improve the ability to function during the depressed period. Therapy can offer emotional support and help you understand emotional factors that may be causing the depression.  Home care    On-going care and support helps people manage this disease.  Find a healthcare provider and therapist who meet your needs. Seek help when you feel like you may be getting ill.    Be kind to yourself. Make it a point to do things that you enjoy (gardening, walking in nature, going to a movie, etc.). Reward yourself for small successes.    Take care of your physical body. Eat a balanced diet (low in saturated fat and high in fruits and vegetables). Exercise at least 3 times a week for 30 minutes. Even mild-moderate exercise (like brisk  walking) can make you feel better.    Avoid alcohol, which can make depression worse.    Take medicine as prescribed.    Tell each of your healthcare providers about all of the prescription drugs, over-the-counter medicines, vitamins, and supplements you take. Certain supplements interact with medicines and can result in dangerous side effects. Ask your pharmacist when you have questions about drug interactions.    Talk with your family and trusted friends about your feelings and thoughts. Ask them to help you recognize behavior changes early so you can get help and, if needed, medicine can be adjusted.  Follow-up care  Follow up with your healthcare provider, or as advised.  Call 911  Call 911 if you:    Have suicidal thoughts, a suicide plan, and the means to carry out the plan    Have trouble breathing    Are very confused    Feel very drowsy or have trouble awakening    Faint or lose consciousness    Have new chest pain that becomes more severe, lasts longer, or spreads into your shoulder, arm, neck, jaw or back  When to seek medical advice  Call your healthcare provider right away if any of these occur:    Feeling extreme depression, fear, anxiety, or anger toward yourself or others    Feeling out of control    Feeling that you may try to harm yourself or another    Hearing voices that others do not hear    Seeing things that others do not see    Can t sleep or eat for 3 days in a row    Friends or family express concern over your behavior and ask you to seek help  Date Last Reviewed: 9/29/2015 2000-2017 [employer]. [address] [place] [address]. All rights reserved. This information is not intended as a substitute for professional medical care. Always follow your healthcare professional's instructions.

## 2017-10-13 NOTE — LETTER
My Depression Action Plan  Name: Tj Mobley   Date of Birth 1995  Date: 10/13/2017    My doctor: Lobo Berman   My clinic: [address] [address] [phone]          GREEN    ZONE   Good Control    What it looks like:     Things are going generally well. You have normal up s and down s. You may even feel depressed from time to time, but bad moods usually last less than a day.   What you need to do:  1. Continue to care for yourself (see self care plan)  2. Check your depression survival kit and update it as needed  3. Follow your physician s recommendations including any medication.  4. Do not stop taking medication unless you consult with your physician first.           YELLOW         ZONE Getting Worse    What it looks like:     Depression is starting to interfere with your life.     It may be hard to get out of bed; you may be starting to isolate yourself from others.    Symptoms of depression are starting to last most all day and this has happened for several days.     You may have suicidal thoughts but they are not constant.   What you need to do:     1. Call your care team, your response to treatment will improve if you keep your care team informed of your progress. Yellow periods are signs an adjustment may need to be made.     2. Continue your self-care, even if you have to fake it!    3. Talk to someone in your support network    4. Open up your depression survival kit           RED    ZONE Medical Alert - Get Help    What it looks like:     Depression is seriously interfering with your life.     You may experience these or other symptoms: You can t get out of bed most days, can t work or engage in other necessary activities, you have trouble taking care of basic hygiene, or basic responsibilities, thoughts of suicide or death that will not go away, self-injurious behavior.     What you need to do:  1. Call your care team and  request a same-day appointment. If they are not available (weekends or after hours) call your local crisis line, emergency room or 911.      Electronically signed by: Jagdish Hill, October 13, 2017    Depression Self Care Plan / Survival Kit    Self-Care for Depression  Here s the deal. Your body and mind are really not as separate as most people think.  What you do and think affects how you feel and how you feel influences what you do and think. This means if you do things that people who feel good do, it will help you feel better.  Sometimes this is all it takes.  There is also a place for medication and therapy depending on how severe your depression is, so be sure to consult with your medical provider and/ or Behavioral Health Consultant if your symptoms are worsening or not improving.     In order to better manage my stress, I will:    Exercise  Get some form of exercise, every day. This will help reduce pain and release endorphins, the  feel good  chemicals in your brain. This is almost as good as taking antidepressants!  This is not the same as joining a gym and then never going! (they count on that by the way ) It can be as simple as just going for a walk or doing some gardening, anything that will get you moving.      Hygiene   Maintain good hygiene (Get out of bed in the morning, Make your bed, Brush your teeth, Take a shower, and Get dressed like you were going to work, even if you are unemployed).  If your clothes don't fit try to get ones that do.    Diet  I will strive to eat foods that are good for me, drink plenty of water, and avoid excessive sugar, caffeine, alcohol, and other mood-altering substances.  Some foods that are helpful in depression are: complex carbohydrates, B vitamins, flaxseed, fish or fish oil, fresh fruits and vegetables.    Psychotherapy  I agree to participate in Individual Therapy (if recommended).    Medication  If prescribed medications, I agree to take them.   Missing doses can result in serious side effects.  I understand that drinking alcohol, or other illicit drug use, may cause potential side effects.  I will not stop my medication abruptly without first discussing it with my provider.    Staying Connected With Others  I will stay in touch with my friends, family members, and my primary care provider/team.    Use your imagination  Be creative.  We all have a creative side; it doesn t matter if it s oil painting, sand castles, or mud pies! This will also kick up the endorphins.    Witness Beauty  (AKA stop and smell the roses) Take a look outside, even in mid-winter. Notice colors, textures. Watch the squirrels and birds.     Service to others  Be of service to others.  There is always someone else in need.  By helping others we can  get out of ourselves  and remember the really important things.  This also provides opportunities for practicing all the other parts of the program.    Humor  Laugh and be silly!  Adjust your TV habits for less news and crime-drama and more comedy.    Control your stress  Try breathing deep, massage therapy, biofeedback, and meditation. Find time to relax each day.     My support system    Clinic Contact:  Phone number:    Contact 1:  Phone number:    Contact 2:  Phone number:    Bahai/:  Phone number:    Therapist:  Phone number:    Local crisis center:    Phone number:    Other community support:  Phone number:

## 2017-10-14 ASSESSMENT — ANXIETY QUESTIONNAIRES: GAD7 TOTAL SCORE: 14

## 2017-10-19 ENCOUNTER — OFFICE VISIT (OUTPATIENT)
Dept: URGENT CARE | Facility: URGENT CARE | Age: 22
End: 2017-10-19
Payer: COMMERCIAL

## 2017-10-19 VITALS
TEMPERATURE: 98.2 F | WEIGHT: 225.2 LBS | SYSTOLIC BLOOD PRESSURE: 143 MMHG | BODY MASS INDEX: 30.54 KG/M2 | DIASTOLIC BLOOD PRESSURE: 91 MMHG | HEART RATE: 76 BPM | OXYGEN SATURATION: 99 %

## 2017-10-19 DIAGNOSIS — Z86.19 H/O CHLAMYDIA INFECTION: ICD-10-CM

## 2017-10-19 DIAGNOSIS — K14.6 TONGUE SORE: Primary | ICD-10-CM

## 2017-10-19 PROCEDURE — 99214 OFFICE O/P EST MOD 30 MIN: CPT | Performed by: PHYSICIAN ASSISTANT

## 2017-10-19 ASSESSMENT — ENCOUNTER SYMPTOMS
EYES NEGATIVE: 1
MUSCULOSKELETAL NEGATIVE: 1
CARDIOVASCULAR NEGATIVE: 1
ENDOCRINE NEGATIVE: 1
PSYCHIATRIC NEGATIVE: 1
NEUROLOGICAL NEGATIVE: 1
RESPIRATORY NEGATIVE: 1
CONSTITUTIONAL NEGATIVE: 1
GASTROINTESTINAL NEGATIVE: 1

## 2017-10-19 NOTE — PROGRESS NOTES
SUBJECTIVE:   Tj Mobley is a 21 year old male who presents to clinic today for the following health issues:      Tongue Pain      Duration: few days    Description (location/character/radiation): tongue    Intensity:  moderate    Accompanying signs and symptoms: swelling, pain    History (similar episodes/previous evaluation): None    Precipitating or alleviating factors: None    Therapies tried and outcome: advil     This started with pain when touching tongue to the top of the mouth  It hurts on the tip of the tongue  Swells when smoking  This is new, never had it before  He just started nortriptyline and Xanax recently - may be related   He has a history of cold sores, but not for a long time     He was also recently diagnosed with chlamydia, he and his girlfriend were both treated one week ago with 1g azithromycin      Problem list and histories reviewed & adjusted, as indicated.  Additional history: as documented    Patient Active Problem List   Diagnosis     Obesity peds (BMI >=95 percentile)     CARDIOVASCULAR SCREENING; LDL GOAL LESS THAN 160     Migraine without aura and without status migrainosus, not intractable     Post-concussion headache     Obesity (BMI 30.0-34.9)     Depression, unspecified depression type     Anxiety     No past surgical history on file.    Social History   Substance Use Topics     Smoking status: Passive Smoke Exposure - Never Smoker     Smokeless tobacco: Never Used     Alcohol use No     No family history on file.      Current Outpatient Prescriptions   Medication Sig Dispense Refill     nortriptyline (PAMELOR) 25 MG capsule Start 1/2 tab PO qhs for 7 days then 1 tab qhs 30 capsule 1     ALPRAZolam (XANAX) 0.25 MG tablet Take 1 tablet (0.25 mg) by mouth 3 times daily as needed for anxiety 15 tablet 0     albuterol (PROAIR HFA/PROVENTIL HFA/VENTOLIN HFA) 108 (90 BASE) MCG/ACT Inhaler Inhale 2-4 puffs into the lungs every 4 hours as needed for shortness of breath / dyspnea or  wheezing 1 Inhaler 2     clonazePAM (KLONOPIN) 0.5 MG tablet Take 0.5-1 tablets (0.25-0.5 mg) by mouth 2 times daily as needed for anxiety 20 tablet 0     Allergies   Allergen Reactions     Penicillins      Sulfa Drugs          Reviewed and updated as needed this visit by clinical staff     Reviewed and updated as needed this visit by Provider         Review of Systems   Constitutional: Negative.    HENT:        As in HPI   Eyes: Negative.    Respiratory: Negative.    Cardiovascular: Negative.    Gastrointestinal: Negative.    Endocrine: Negative.    Genitourinary: Negative.    Musculoskeletal: Negative.    Skin: Negative.    Neurological: Negative.    Psychiatric/Behavioral: Negative.          OBJECTIVE:     BP (!) 143/91 (BP Location: Left arm, Patient Position: Chair, Cuff Size: Adult Regular)  Pulse 76  Temp 98.2  F (36.8  C) (Oral)  Wt 225 lb 3.2 oz (102.2 kg)  SpO2 99%  BMI 30.54 kg/m2  Body mass index is 30.54 kg/(m^2).  GENERAL: healthy, alert and no distress  EYES: Eyes grossly normal to inspection, PERRL and conjunctivae and sclerae normal  HENT: normal cephalic/atraumatic, oral mucous membranes moist and small white lesions (individual taste buds) on the tip of the tongue are tender   MS: no gross musculoskeletal defects noted, no edema    Diagnostic Test Results:  none     ASSESSMENT/PLAN:     1. Tongue sore  This looks to be an inflamed taste bud  It should improve in a few days  If it continues, he will return to further evaluation     2. H/O chlamydia infection  In two weeks, have both him and girlfriend tested for chlamydia clearance   It is too early today, since they were treated one week ago     Carin Clemens PA-C  [facility]

## 2017-10-19 NOTE — MR AVS SNAPSHOT
"              After Visit Summary   10/19/2017    Tj Mobley    MRN: [id number]           Patient Information     Date Of Birth          1995        Visit Information        Provider Department      10/19/2017 5:00 PM Carin Clemens PA-C [facility]        Today's Diagnoses     Tongue sore    -  1    H/O chlamydia infection           Follow-ups after your visit        Who to contact     If you have questions or need follow up information about today's clinic visit or your schedule please contact [facility] directly at [phone].  Normal or non-critical lab and imaging results will be communicated to you by Workablehart, letter or phone within 4 business days after the clinic has received the results. If you do not hear from us within 7 days, please contact the clinic through Workablehart or phone. If you have a critical or abnormal lab result, we will notify you by phone as soon as possible.  Submit refill requests through PVPower or call your pharmacy and they will forward the refill request to us. Please allow 3 business days for your refill to be completed.          Additional Information About Your Visit        MyChart Information     PVPower lets you send messages to your doctor, view your test results, renew your prescriptions, schedule appointments and more. To sign up, go to [email or web address] . Click on \"Log in\" on the left side of the screen, which will take you to the Welcome page. Then click on \"Sign up Now\" on the right side of the page.     You will be asked to enter the access code listed below, as well as some personal information. Please follow the directions to create your username and password.     Your access code is: [email or web address]  Expires: 2017 12:28 PM     Your access code will  in 90 days. If you need help or a new code, please call your [facility] or [phone].        Care EveryWhere ID     This is your Care " EveryWhere ID. This could be used by other organizations to access your [facility] medical records  [id number]        Your Vitals Were     Pulse Temperature Pulse Oximetry BMI (Body Mass Index)          76 98.2  F (36.8  C) (Oral) 99% 30.54 kg/m2         Blood Pressure from Last 3 Encounters:   10/19/17 (!) 143/91   10/13/17 127/79   10/05/17 138/81    Weight from Last 3 Encounters:   10/19/17 225 lb 3.2 oz (102.2 kg) (97 %)*   10/13/17 227 lb (103 kg) (98 %)*   10/05/17 223 lb 12.8 oz (101.5 kg) (97 %)*     * Growth percentiles are based on [facility] 2-20 Years data.              Today, you had the following     No orders found for display       Primary Care Provider Office Phone # Fax #    Lobo Berman [phone]       [address]  [facility] [address]        Equal Access to Services     [facility]: Hadii aad ku hadasho Soomaali, waaxda luqadaha, qaybta kaalmada adeegyada, waxay jesus garcia . So [facility] [phone].    ATENCIÓN: Si habla español, tiene a plascencia disposición servicios gratuitos de asistencia lingüística. [facility] [phone].    We comply with applicable federal civil rights laws and [place] laws. We do not discriminate on the basis of race, color, national origin, age, disability, sex, sexual orientation, or gender identity.            Thank you!     Thank you for choosing [facility]  for your care. Our goal is always to provide you with excellent care. Hearing back from our patients is one way we can continue to improve our services. Please take a few minutes to complete the written survey that you may receive in the mail after your visit with us. Thank you!             Your Updated Medication List - Protect others around you: Learn how to safely use, store and throw away your medicines at www.disposemymeds.org.          This list is accurate as of: 10/19/17  5:53 PM.  Always use your most recent med list.                   Brand Name  Dispense Instructions for use Diagnosis    albuterol 108 (90 BASE) MCG/ACT Inhaler    PROAIR HFA/PROVENTIL HFA/VENTOLIN HFA    1 Inhaler    Inhale 2-4 puffs into the lungs every 4 hours as needed for shortness of breath / dyspnea or wheezing    Viral URI with cough, Bronchospasm       ALPRAZolam 0.25 MG tablet    XANAX    15 tablet    Take 1 tablet (0.25 mg) by mouth 3 times daily as needed for anxiety    Anxiety       clonazePAM 0.5 MG tablet    klonoPIN    20 tablet    Take 0.5-1 tablets (0.25-0.5 mg) by mouth 2 times daily as needed for anxiety    Major depressive disorder, single episode, moderate (H)       nortriptyline 25 MG capsule    PAMELOR    30 capsule    Start 1/2 tab PO qhs for 7 days then 1 tab qhs    Depression, unspecified depression type

## 2017-10-19 NOTE — NURSING NOTE
Chief Complaint   Patient presents with     Tongue Lesion(S)       Initial BP (!) 143/91 (BP Location: Left arm, Patient Position: Chair, Cuff Size: Adult Regular)  Pulse 76  Temp 98.2  F (36.8  C) (Oral)  Wt 225 lb 3.2 oz (102.2 kg)  SpO2 99%  BMI 30.54 kg/m2 Estimated body mass index is 30.54 kg/(m^2) as calculated from the following:    Height as of 5/26/17: 6' (1.829 m).    Weight as of this encounter: 225 lb 3.2 oz (102.2 kg).  Medication Reconciliation: deanne Vásquez

## 2017-11-12 ENCOUNTER — HEALTH MAINTENANCE LETTER (OUTPATIENT)
Age: 22
End: 2017-11-12

## 2018-03-12 ENCOUNTER — TELEPHONE (OUTPATIENT)
Dept: FAMILY MEDICINE | Facility: CLINIC | Age: 23
End: 2018-03-12

## 2018-03-12 NOTE — LETTER
03/12/18      Tj Mobley  [address] [place] [address]          Dear Tj Mobley     At [facility], we care about your health and are committed to providing quality patient care, which includes staying current on your depression management and having a current Patient Health Questionnaire (PHQ9) on file for you. We ask patiens to recheck this every 6 months if stable and more often if symptoms are not ideally controlled. The PHQ9 is a series of questions which helps your doctor with diagnosing depression and monitoring treatment response. This can also help track a patients overall depression severity as well as the specific symptoms that are improving or not with treatment.    Our records indicated that you need your PHQ9 updated and a office visit with your provider. If you have transferred care to another clinic please call us so that we do not continue to send you reminder letters.    Please contact us if you have additional questions or concerns. Thank you for choosing [facility] for you care. We appreciate the opportunity to serve you and look forward to supporting your healthcare needs in the future.          If you have any questions or concerns, please call myself or my nurse at [phone].      Sincerely,      Jagdish Hill/[employer]

## 2018-03-12 NOTE — TELEPHONE ENCOUNTER
Panel Management Review      BP Readings from Last 1 Encounters:   10/19/17 (!) 143/91      Last Office Visit with this department: 10/13/2017    Fail List measure:     Depression / Dysthymia review    Measure:  Needs PHQ-9 score of 4 or less during index window.  Administer PHQ-9 and if score is 5 or more, send encounter to provider for next steps.    5 - 7 month window range:      PHQ-9 SCORE 5/30/2017 10/13/2017   Total Score 21 20       If PHQ-9 recheck is 5 or more, route to provider for next steps.    Patient is due for:  PHQ9      Patient is due/failing the following:   PHQ9    Action needed:   Patient needs to do PHQ9 and priyanka needs f/u appt- .    Type of outreach:    call or letter    Questions for provider review:    None                                                                                                                                    ABDIEL Duval      Chart routed to Care Team .

## 2018-04-04 ENCOUNTER — TELEPHONE (OUTPATIENT)
Dept: FAMILY MEDICINE | Facility: CLINIC | Age: 23
End: 2018-04-04

## 2018-04-04 ENCOUNTER — OFFICE VISIT (OUTPATIENT)
Dept: FAMILY MEDICINE | Facility: CLINIC | Age: 23
End: 2018-04-04
Payer: COMMERCIAL

## 2018-04-04 ENCOUNTER — RADIANT APPOINTMENT (OUTPATIENT)
Dept: GENERAL RADIOLOGY | Facility: CLINIC | Age: 23
End: 2018-04-04
Attending: FAMILY MEDICINE
Payer: COMMERCIAL

## 2018-04-04 VITALS
WEIGHT: 247.8 LBS | SYSTOLIC BLOOD PRESSURE: 135 MMHG | TEMPERATURE: 98.5 F | HEIGHT: 72 IN | OXYGEN SATURATION: 99 % | DIASTOLIC BLOOD PRESSURE: 87 MMHG | HEART RATE: 87 BPM | BODY MASS INDEX: 33.56 KG/M2

## 2018-04-04 DIAGNOSIS — F41.9 ANXIETY: ICD-10-CM

## 2018-04-04 DIAGNOSIS — F32.1 MODERATE MAJOR DEPRESSION (H): ICD-10-CM

## 2018-04-04 DIAGNOSIS — R07.9 LEFT SIDED CHEST PAIN: Primary | ICD-10-CM

## 2018-04-04 DIAGNOSIS — R07.9 LEFT SIDED CHEST PAIN: ICD-10-CM

## 2018-04-04 DIAGNOSIS — R06.2 WHEEZING: ICD-10-CM

## 2018-04-04 PROBLEM — F32.A DEPRESSION, UNSPECIFIED DEPRESSION TYPE: Status: RESOLVED | Noted: 2017-10-13 | Resolved: 2018-04-04

## 2018-04-04 PROCEDURE — 71046 X-RAY EXAM CHEST 2 VIEWS: CPT | Mod: FY

## 2018-04-04 PROCEDURE — 93000 ELECTROCARDIOGRAM COMPLETE: CPT | Performed by: FAMILY MEDICINE

## 2018-04-04 PROCEDURE — 99214 OFFICE O/P EST MOD 30 MIN: CPT | Performed by: FAMILY MEDICINE

## 2018-04-04 RX ORDER — CITALOPRAM HYDROBROMIDE 20 MG/1
20 TABLET ORAL DAILY
Qty: 30 TABLET | Refills: 1 | Status: SHIPPED | OUTPATIENT
Start: 2018-04-04 | End: 2020-10-05

## 2018-04-04 RX ORDER — PREDNISONE 20 MG/1
40 TABLET ORAL DAILY
Qty: 10 TABLET | Refills: 0 | Status: SHIPPED | OUTPATIENT
Start: 2018-04-04 | End: 2018-04-09

## 2018-04-04 RX ORDER — ALPRAZOLAM 0.25 MG
0.25 TABLET ORAL 3 TIMES DAILY PRN
Qty: 15 TABLET | Refills: 0 | Status: CANCELLED | OUTPATIENT
Start: 2018-04-04

## 2018-04-04 RX ORDER — ALPRAZOLAM 0.25 MG
0.25 TABLET ORAL 3 TIMES DAILY PRN
Qty: 15 TABLET | Refills: 0 | Status: SHIPPED | OUTPATIENT
Start: 2018-04-04 | End: 2020-10-05

## 2018-04-04 ASSESSMENT — ANXIETY QUESTIONNAIRES
IF YOU CHECKED OFF ANY PROBLEMS ON THIS QUESTIONNAIRE, HOW DIFFICULT HAVE THESE PROBLEMS MADE IT FOR YOU TO DO YOUR WORK, TAKE CARE OF THINGS AT HOME, OR GET ALONG WITH OTHER PEOPLE: VERY DIFFICULT
1. FEELING NERVOUS, ANXIOUS, OR ON EDGE: MORE THAN HALF THE DAYS
2. NOT BEING ABLE TO STOP OR CONTROL WORRYING: NEARLY EVERY DAY
5. BEING SO RESTLESS THAT IT IS HARD TO SIT STILL: MORE THAN HALF THE DAYS
GAD7 TOTAL SCORE: 17
7. FEELING AFRAID AS IF SOMETHING AWFUL MIGHT HAPPEN: MORE THAN HALF THE DAYS
6. BECOMING EASILY ANNOYED OR IRRITABLE: MORE THAN HALF THE DAYS
3. WORRYING TOO MUCH ABOUT DIFFERENT THINGS: NEARLY EVERY DAY

## 2018-04-04 ASSESSMENT — PAIN SCALES - GENERAL: PAINLEVEL: NO PAIN (0)

## 2018-04-04 ASSESSMENT — PATIENT HEALTH QUESTIONNAIRE - PHQ9: 5. POOR APPETITE OR OVEREATING: NEARLY EVERY DAY

## 2018-04-04 NOTE — LETTER
[address] [address]  Phone: [phone]    April 4, 2018        Tj IRAHETA Bonniedonovan  [address] [place] [address]          To whom it may concern:    RE: Tj Mobley    Patient was seen and treated today at our clinic.    Please contact me for questions or concerns.      Sincerely,        Lobo Berman MD

## 2018-04-04 NOTE — PROGRESS NOTES
SUBJECTIVE:   Tj Mobley is a 22 year old male who presents to clinic today for the following health issues:      CHEST PAIN     Onset: > one month    Description:   Location:  left side  Character: sharp, squeezing  Radiation: None.  Duration: 30-60 seconds     Intensity: moderate    Progression of Symptoms:  worsening    Accompanying Signs & Symptoms:  Shortness of breath: YES- hard to breathe  Sweating: YES  Nausea/vomiting: no  Lightheadedness: no  Palpitations: YES  Fever/Chills: no  Cough: YES  Heartburn: YES    History:   Family history of heart disease no  Tobacco use: YES    Precipitating factors:   Worse with exertion: no  Worse with deep breaths :  no  Related to food: no    Alleviating factors:  None.       Therapies Tried and outcome: None. Take tums for hearburn.      Problem list and histories reviewed & adjusted, as indicated.  Additional history: as documented    Patient Active Problem List   Diagnosis     Obesity peds (BMI >=95 percentile)     CARDIOVASCULAR SCREENING; LDL GOAL LESS THAN 160     Migraine without aura and without status migrainosus, not intractable     Post-concussion headache     Obesity (BMI 30.0-34.9)     Depression, unspecified depression type     Anxiety     History reviewed. No pertinent surgical history.    Social History   Substance Use Topics     Smoking status: Current Every Day Smoker     Packs/day: 1.00     Types: Cigarettes     Smokeless tobacco: Never Used     Alcohol use No     History reviewed. No pertinent family history.        Reviewed and updated as needed this visit by clinical staff  Tobacco  Allergies  Meds  Med Hx  Surg Hx  Fam Hx  Soc Hx      Reviewed and updated as needed this visit by Provider         ROS:  Constitutional, HEENT, cardiovascular, pulmonary, GI, , musculoskeletal, neuro, skin, endocrine and psych systems are negative, except as otherwise noted.    OBJECTIVE:     /87 (BP Location: Left arm, Patient Position: Chair, Cuff  Size: Adult Large)  Pulse 87  Temp 98.5  F (36.9  C) (Oral)  Ht 6' (1.829 m)  Wt 247 lb 12.8 oz (112.4 kg)  SpO2 99%  BMI 33.61 kg/m2  Body mass index is 33.61 kg/(m^2).  GENERAL: healthy, alert and no distress  NECK: no adenopathy, no asymmetry, masses, or scars and thyroid normal to palpation  RESP: diffuse wheezing  CV: regular rate and rhythm, normal S1 S2, no S3 or S4, no murmur, click or rub, no peripheral edema and peripheral pulses strong  ABDOMEN: soft, nontender, no hepatosplenomegaly, no masses and bowel sounds normal  MS: no gross musculoskeletal defects noted, no edema    Diagnostic Test Results:  none     ASSESSMENT/PLAN:       1. Left sided chest pain  Likely related to anxiety below. No acute changes on cxr or ekg.  - XR Chest 2 Views; Future  - EKG 12-lead complete w/read - Clinics    2. Moderate major depression (H)  Start citalopram 20 mg daily. Discussed potential side effects. RTC in 1 month for recheck.  - citalopram (CELEXA) 20 MG tablet; Take 1 tablet (20 mg) by mouth daily  Dispense: 30 tablet; Refill: 1    3. Anxiety  Start citalopram as above. Recheck in 1 month.  - citalopram (CELEXA) 20 MG tablet; Take 1 tablet (20 mg) by mouth daily  Dispense: 30 tablet; Refill: 1  - ALPRAZolam (XANAX) 0.25 MG tablet; Take 1 tablet (0.25 mg) by mouth 3 times daily as needed for anxiety  Dispense: 15 tablet; Refill: 0    4. Wheezing  H/o asthma. Treat with oral steroidal burst for 5 days. Albuterol as needed. RTC if not improving.  - predniSONE (DELTASONE) 20 MG tablet; Take 2 tablets (40 mg) by mouth daily for 5 days  Dispense: 10 tablet; Refill: 0    See Patient Instructions    Lobo Berman MD, MD  [facility]

## 2018-04-04 NOTE — PATIENT INSTRUCTIONS
At [facility], we strive to deliver an exceptional experience to you, every time we see you.  If you receive a survey in the mail, please send us back your thoughts. We really do value your feedback.    Based on your medical history, these are the current health maintenance/preventive care services that you are due for (some may have been done at this visit.)  Health Maintenance Due   Topic Date Due     PEDS DTAP/TDAP (3 - Td) 03/28/2011     PHQ-9 Q6 MONTHS  04/13/2018         Suggested websites for health information:  Www.Timetovisit.org : Up to date and easily searchable information on multiple topics.  Www.SphereUp.gov : medication info, interactive tutorials, watch real surgeries online  Www.familydoctor.org : good info from the Academy of Family Physicians  Www.cdc.gov : public health info, travel advisories, epidemics (H1N1)  Www.aap.org : children's health info, normal development, vaccinations  Www.health.[facility].[place].us : [place] dept of health, public health issues in [place], N1N1    Your care team:                            Family Medicine Internal Medicine   MD Scar Bonilla MD Shantel Branch-Fleming, MD Katya Georgiev PA-C Nam Ho, MD Pediatrics   BRYANT Dale, MD Nicole Quintana CNP, MD Deborah Mielke, MD Kim Thein, APRN CNP      Clinic hours: Monday - Thursday 7 am-7 pm; Fridays 7 am-5 pm.   Urgent care: Monday - Friday 11 am-9 pm; Saturday and Sunday 9 am-5 pm.  Pharmacy : Monday -Thursday 8 am-8 pm; Friday 8 am-6 pm; Saturday and Sunday 9 am-5 pm.     Clinic: [phone]   Pharmacy: [phone]

## 2018-04-04 NOTE — TELEPHONE ENCOUNTER
Tj Mobley is a 22 year old male who calls with chest pain.     PRESENTING PROBLEM:  Sharp chest pain    NURSING ASSESSMENT:  Patient complains of chest pain, chest pressure/discomfort and dyspnea.  Onset:  For two weeks, happens once every 3 day  Pain is characterized as sharp goes away after 20 seconds  Severity 7-8 out of 10, localized and brief  Located left chest   Radiates to none.  Duration 20 seconds.  Associated symptoms SOB, lightheadedness, diarrhea, cough and stress/anxiety.  Exacerbated by no known provoking events.  Relieved by none.  Cardiac risk factors: smoker.    Allergies:   Allergies   Allergen Reactions     Penicillins      Sulfa Drugs        NURSING PLAN: Nursing advice to patient schedule for assessment    RECOMMENDED DISPOSITION:   Will comply with recommendation: Yes  If further questions/concerns or if symptoms do not improve, worsen or new symptoms develop, call your PCP or [facility] as soon as possible.      Guideline used:  Telephone Triage Protocols for Nurses, Fifth Edition, Lisa Lara RN

## 2018-04-04 NOTE — MR AVS SNAPSHOT
After Visit Summary   4/4/2018    Tj Mobley    MRN: [id number]           Patient Information     Date Of Birth          1995        Visit Information        Provider Department      4/4/2018 9:00 AM Lobo Berman MD [facility]        Today's Diagnoses     Left sided chest pain    -  1    Moderate major depression (H)        Anxiety        Wheezing          Care Instructions    At [facility], we strive to deliver an exceptional experience to you, every time we see you.  If you receive a survey in the mail, please send us back your thoughts. We really do value your feedback.    Based on your medical history, these are the current health maintenance/preventive care services that you are due for (some may have been done at this visit.)  Health Maintenance Due   Topic Date Due     PEDS DTAP/TDAP (3 - Td) 03/28/2011     PHQ-9 Q6 MONTHS  04/13/2018         Suggested websites for health information:  Www.Lumenpulse : Up to date and easily searchable information on multiple topics.  Www.medlineplus.gov : medication info, interactive tutorials, watch real surgeries online  Www.familydoctor.org : good info from the Academy of Family Physicians  Www.cdc.gov : public health info, travel advisories, epidemics (H1N1)  Www.aap.org : children's health info, normal development, vaccinations  Www.health.state.[place].us : [place] dept of health, public health issues in [place], N1N1    Your care team:                            Family Medicine Internal Medicine   MD Scar Bonilla MD Shantel Branch-Fleming, MD Katya Georgiev PA-C Nam Ho, MD Pediatrics   BRYANT Dale, GUME Martinez APRN MD Nicole Lui MD Deborah Mielke, MD Kim Thein, APRN CNP      Clinic hours: Monday - Thursday 7 am-7 pm; Fridays 7 am-5 pm.   Urgent care: Monday - Friday 11 am-9 pm; Saturday and Sunday 9 am-5 pm.  Pharmacy : Monday  "-Thursday 8 am-8 pm; Friday 8 am-6 pm; Saturday and  9 am-5 pm.     Clinic: [phone]   Pharmacy: [phone]            Follow-ups after your visit        Who to contact     If you have questions or need follow up information about today's clinic visit or your schedule please contact [facility] [place] directly at [phone].  Normal or non-critical lab and imaging results will be communicated to you by [employer], letter or phone within 4 business days after the clinic has received the results. If you do not hear from us within 7 days, please contact the clinic through [employer] or phone. If you have a critical or abnormal lab result, we will notify you by phone as soon as possible.  Submit refill requests through [employer] or call your pharmacy and they will forward the refill request to us. Please allow 3 business days for your refill to be completed.          Additional Information About Your Visit        OnState Information     [employer] lets you send messages to your doctor, view your test results, renew your prescriptions, schedule appointments and more. To sign up, go to [email or web address] . Click on \"Log in\" on the left side of the screen, which will take you to the Welcome page. Then click on \"Sign up Now\" on the right side of the page.     You will be asked to enter the access code listed below, as well as some personal information. Please follow the directions to create your username and password.     Your access code is: [email or web address]  Expires: 7/3/2018  9:41 AM     Your access code will  in 90 days. If you need help or a new code, please call your [place] clinic or [phone].        Care EveryWhere ID     This is your Care EveryWhere ID. This could be used by other organizations to access your [place] medical records  [id number]        Your Vitals Were     Pulse Temperature Height Pulse Oximetry BMI (Body Mass Index)       87 98.5  F (36.9  C) (Oral) 6' (1.829 m) " 99% 33.61 kg/m2        Blood Pressure from Last 3 Encounters:   04/04/18 135/87   10/19/17 (!) 143/91   10/13/17 127/79    Weight from Last 3 Encounters:   04/04/18 247 lb 12.8 oz (112.4 kg)   10/19/17 225 lb 3.2 oz (102.2 kg) (97 %)*   10/13/17 227 lb (103 kg) (98 %)*     * Growth percentiles are based on [facility] 2-20 Years data.              We Performed the Following     EKG 12-lead complete w/read - Clinics          Today's Medication Changes          These changes are accurate as of 4/4/18  9:41 AM.  If you have any questions, ask your nurse or doctor.               Start taking these medicines.        Dose/Directions    citalopram 20 MG tablet   Commonly known as:  celeXA   Used for:  Moderate major depression (H), Anxiety   Started by:  Lobo Berman MD        Dose:  20 mg   Take 1 tablet (20 mg) by mouth daily   Quantity:  30 tablet   Refills:  1       predniSONE 20 MG tablet   Commonly known as:  DELTASONE   Used for:  Wheezing   Started by:  Lobo Berman MD        Dose:  40 mg   Take 2 tablets (40 mg) by mouth daily for 5 days   Quantity:  10 tablet   Refills:  0         Stop taking these medicines if you haven't already. Please contact your care team if you have questions.     nortriptyline 25 MG capsule   Commonly known as:  PAMELOR   Stopped by:  Lobo Berman MD                Where to get your medicines      These medications were sent to [facility] [address] [place] [address] [address] [facility] [address] [place] [address]     Phone:  [phone]     citalopram 20 MG tablet    predniSONE 20 MG tablet         Some of these will need a paper prescription and others can be bought over the counter.  Ask your nurse if you have questions.     Bring a paper prescription for each of these medications     ALPRAZolam 0.25 MG tablet                Primary Care Provider Office Phone # Fax #    Lobo Berman [phone]       [address]  [place]  [address]        Equal Access to Services     [facility]: Hadii ambika jacobo damon Benson, waaxda luqadaha, qaybta kaalmada shaniqua, waxjong jesus botellonixonclay fox. So [facility] [phone].    ATENCIÓN: Si habla español, tiene a plascencia disposición servicios gratuitos de asistencia lingüística. Tracyame al [phone].    We comply with applicable federal civil rights laws and [place] laws. We do not discriminate on the basis of race, color, national origin, age, disability, sex, sexual orientation, or gender identity.            Thank you!     Thank you for choosing [facility]  for your care. Our goal is always to provide you with excellent care. Hearing back from our patients is one way we can continue to improve our services. Please take a few minutes to complete the written survey that you may receive in the mail after your visit with us. Thank you!             Your Updated Medication List - Protect others around you: Learn how to safely use, store and throw away your medicines at www.disposemymeds.org.          This list is accurate as of 4/4/18  9:41 AM.  Always use your most recent med list.                   Brand Name Dispense Instructions for use Diagnosis    albuterol 108 (90 BASE) MCG/ACT Inhaler    PROAIR HFA/PROVENTIL HFA/VENTOLIN HFA    1 Inhaler    Inhale 2-4 puffs into the lungs every 4 hours as needed for shortness of breath / dyspnea or wheezing    Viral URI with cough, Bronchospasm       ALPRAZolam 0.25 MG tablet    XANAX    15 tablet    Take 1 tablet (0.25 mg) by mouth 3 times daily as needed for anxiety    Anxiety       citalopram 20 MG tablet    celeXA    30 tablet    Take 1 tablet (20 mg) by mouth daily    Moderate major depression (H), Anxiety       predniSONE 20 MG tablet    DELTASONE    10 tablet    Take 2 tablets (40 mg) by mouth daily for 5 days    Wheezing

## 2018-04-05 ASSESSMENT — ANXIETY QUESTIONNAIRES: GAD7 TOTAL SCORE: 17

## 2018-04-05 ASSESSMENT — PATIENT HEALTH QUESTIONNAIRE - PHQ9: SUM OF ALL RESPONSES TO PHQ QUESTIONS 1-9: 19

## 2018-06-05 ENCOUNTER — OFFICE VISIT (OUTPATIENT)
Dept: URGENT CARE | Facility: URGENT CARE | Age: 23
End: 2018-06-05
Payer: COMMERCIAL

## 2018-06-05 VITALS
HEART RATE: 77 BPM | SYSTOLIC BLOOD PRESSURE: 144 MMHG | WEIGHT: 254 LBS | RESPIRATION RATE: 16 BRPM | OXYGEN SATURATION: 100 % | BODY MASS INDEX: 34.45 KG/M2 | TEMPERATURE: 97.8 F | DIASTOLIC BLOOD PRESSURE: 88 MMHG

## 2018-06-05 DIAGNOSIS — Z20.2 CHLAMYDIA CONTACT: Primary | ICD-10-CM

## 2018-06-05 PROCEDURE — 87591 N.GONORRHOEAE DNA AMP PROB: CPT | Performed by: PEDIATRICS

## 2018-06-05 PROCEDURE — 99213 OFFICE O/P EST LOW 20 MIN: CPT | Performed by: PEDIATRICS

## 2018-06-05 PROCEDURE — 87491 CHLMYD TRACH DNA AMP PROBE: CPT | Performed by: PEDIATRICS

## 2018-06-05 RX ORDER — DOXYCYCLINE HYCLATE 100 MG
100 TABLET ORAL 2 TIMES DAILY
Qty: 14 TABLET | Refills: 0 | Status: SHIPPED | OUTPATIENT
Start: 2018-06-05 | End: 2020-10-05

## 2018-06-05 RX ORDER — AZITHROMYCIN 500 MG/1
1000 TABLET, FILM COATED ORAL ONCE
Qty: 2 TABLET | Refills: 0 | Status: SHIPPED | OUTPATIENT
Start: 2018-06-05 | End: 2018-06-05

## 2018-06-05 NOTE — PROGRESS NOTES
SUBJECTIVE:   Tj Mobley is a 22 year old male who presents to clinic today because of:    Chief Complaint   Patient presents with     STD     screening        HPI  Concerns: STD testing    Tj's girlfriend recently tested positive for Chlamydia.  She is receiving antibiotic treatment today.  Tj denies any symptoms.  They tested positive for Chlamydia and completed treatment 9 months ago but did not get retested.  They are using Nexplanon for contraception. Tj does not desire any other STD testing.      ROS  Constitutional, eye, ENT, skin, respiratory, cardiac, and GI are normal except as otherwise noted.    PROBLEM LIST  Patient Active Problem List    Diagnosis Date Noted     Moderate major depression (H) 04/04/2018     Priority: Medium     Anxiety 10/13/2017     Priority: Medium     Obesity (BMI 30.0-34.9) 05/26/2017     Priority: Medium     Migraine without aura and without status migrainosus, not intractable 04/24/2017     Priority: Medium     Post-concussion headache 04/24/2017     Priority: Medium     CARDIOVASCULAR SCREENING; LDL GOAL LESS THAN 160 03/15/2016     Priority: Medium     Obesity peds (BMI >=95 percentile) 05/06/2014     Priority: Medium      MEDICATIONS  Current Outpatient Prescriptions   Medication Sig Dispense Refill     albuterol (PROAIR HFA/PROVENTIL HFA/VENTOLIN HFA) 108 (90 BASE) MCG/ACT Inhaler Inhale 2-4 puffs into the lungs every 4 hours as needed for shortness of breath / dyspnea or wheezing 1 Inhaler 2     ALPRAZolam (XANAX) 0.25 MG tablet Take 1 tablet (0.25 mg) by mouth 3 times daily as needed for anxiety 15 tablet 0     citalopram (CELEXA) 20 MG tablet Take 1 tablet (20 mg) by mouth daily 30 tablet 1      ALLERGIES  Allergies   Allergen Reactions     Penicillins      Sulfa Drugs        Reviewed and updated as needed this visit by clinical staff  Tobacco  Allergies  Meds  Med Hx  Surg Hx  Fam Hx  Soc Hx        Reviewed and updated as needed this visit by Provider        OBJECTIVE:     /88 (BP Location: Left arm, Patient Position: Chair, Cuff Size: Adult Large)  Pulse 77  Temp 97.8  F (36.6  C) (Oral)  Resp 16  Wt 254 lb (115.2 kg)  SpO2 100%  BMI 34.45 kg/m2  Normalized stature-for-age data not available for patients older than 20 years.  Normalized weight-for-age data not available for patients older than 20 years.  Normalized BMI data available only for age 0 to 20 years.  Normalized stature-for-age data not available for patients older than 20 years.    Gen:  Alert, NAD    DIAGNOSTICS: None    ASSESSMENT/PLAN:   1. Chlamydia contact  Will treat empirically.  Will treat with Doxycycline due to interaction with Citalopram and Zithromax.  Avoid unprotected sex for 1 week after completing treatment.  Follow-up in 2-4 weeks for recheck.  - NEISSERIA GONORRHOEA PCR  - CHLAMYDIA TRACHOMATIS PCR  - doxycycline (VIBRA-TABS) 100 MG tablet; Take 1 tablet (100 mg) by mouth 2 times daily  Dispense: 14 tablet; Refill: 0    FOLLOW UP: If not improving or if worsening    Nicole Spencer MD

## 2018-06-05 NOTE — MR AVS SNAPSHOT
After Visit Summary   6/5/2018    Tj Mobley    MRN: [id number]           Patient Information     Date Of Birth          1995        Visit Information        Provider Department      6/5/2018 5:05 PM Nicole Spencer MD [facility]        Today's Diagnoses     Screen for STD (sexually transmitted disease)    -  1    Chlamydia contact          Care Instructions    - Avoid unprotected sex until 1 week after treatment  - Follow-up in 2-4 weeks for recheck  Chlamydia  Chlamydia is a very common sexually transmitted disease (STD). Most people do not have symptoms. Because of this, chlamydia may not be noticed until it causes severe problems. Left untreated, this infection can cause women and men to become sterile. This means they will not be able to have children.    Symptoms  Many people with chlamydia have no symptoms. Women are more likely than men not to have symptoms.  If symptoms show up in women, they include:    Abnormal vaginal discharge    Bleeding between periods    Pain or burning during urination  If symptoms show up in men, they include:    Clear discharge (drip) from the penis or anus    Pain or burning during urination  These symptoms usually disappear after a few weeks, whether or not you are treated. However, if you are not treated, the chlamydia will still be present and can cause long-term problems.  Potential problems  If the infection is not treated, it can lead to more serious health problems. In women, this can be pelvic inflammatory disease (PID). PID can make a woman sterile. It can also cause an ectopic (tubal) pregnancy. This type of pregnancy cannot be carried to term. Symptoms of PID include fever, pain during sex, and pain in the belly.  Sexually active women should get checked for chlamydia regularly. This can help prevent PID.   Treatment  When found early, chlamydia can be treated. It can be cured with antibiotic medicines. If you have it,  "tell your partner right away. Because women often don t have symptoms, men should ask their partners to get tested.  Prevention  Know your partner s history. Protect yourself by using a latex condom whenever you have sex. If you are pregnant, take extra care to get proper treatment. Untreated chlamydia in a pregnant woman can pass the infection on to the baby, causing possible eye, ear, or lung problems. There is also the possibility of a premature delivery.  Resources  American Social Health Association STD Hotline  757.689.7777  www.ashastd.org  Centers for Disease Control and Prevention  281.340.9162  www.cdc.gov/std   Date Last Reviewed: 12/1/2016 2000-2017 [employer]. [address] [place] [address], [place] [address]. All rights reserved. This information is not intended as a substitute for professional medical care. Always follow your healthcare professional's instructions.                Follow-ups after your visit        Who to contact     If you have questions or need follow up information about today's clinic visit or your schedule please contact [facility] directly at [phone].  Normal or non-critical lab and imaging results will be communicated to you by [employer], letter or phone within 4 business days after the clinic has received the results. If you do not hear from us within 7 days, please contact the clinic through [employer] or phone. If you have a critical or abnormal lab result, we will notify you by phone as soon as possible.  Submit refill requests through [employer] or call your pharmacy and they will forward the refill request to us. Please allow 3 business days for your refill to be completed.          Additional Information About Your Visit        [employer] Information     [employer] lets you send messages to your doctor, view your test results, renew your prescriptions, schedule appointments and more. To sign up, go to [email or web address] . Click on \"Log in\" on " "the left side of the screen, which will take you to the Welcome page. Then click on \"Sign up Now\" on the right side of the page.     You will be asked to enter the access code listed below, as well as some personal information. Please follow the directions to create your username and password.     Your access code is: [email or web address]  Expires: 7/3/2018  9:41 AM     Your access code will  in 90 days. If you need help or a new code, please call your [facility] or [phone].        Care EveryWhere ID     This is your Care EveryWhere ID. This could be used by other organizations to access your [place] medical records  [id number]        Your Vitals Were     Pulse Temperature Respirations Pulse Oximetry BMI (Body Mass Index)       77 97.8  F (36.6  C) (Oral) 16 100% 34.45 kg/m2        Blood Pressure from Last 3 Encounters:   18 144/88   18 135/87   10/19/17 (!) 143/91    Weight from Last 3 Encounters:   18 254 lb (115.2 kg)   18 247 lb 12.8 oz (112.4 kg)   10/19/17 225 lb 3.2 oz (102.2 kg) (97 %)*     * Growth percentiles are based on [facility] 2-20 Years data.              We Performed the Following     CHLAMYDIA TRACHOMATIS PCR     HIV Antigen Antibody Combo     NEISSERIA GONORRHOEA PCR     Treponema Abs w Reflex to RPR and Titer          Today's Medication Changes          These changes are accurate as of 18  5:34 PM.  If you have any questions, ask your nurse or doctor.               Start taking these medicines.        Dose/Directions    doxycycline 100 MG tablet   Commonly known as:  VIBRA-TABS   Used for:  Chlamydia contact   Started by:  Nicole Spencer MD        Dose:  100 mg   Take 1 tablet (100 mg) by mouth 2 times daily   Quantity:  14 tablet   Refills:  0            Where to get your medicines      These medications were sent to [facility] [id number]  [place]  [address] AT [facility] [address]  [address] [place] [address]    "  Phone:  [phone]     doxycycline 100 MG tablet                Primary Care Provider Office Phone # Fax #    Lobo Berman [phone]       [address]  [facility] [address]        Equal Access to Services     SEJAL [facility]TON : Hadii aad ku hadkhushbuo Soomaali, waaxda luqadaha, qaybta kaalmada adeegyada, waxjong idiin hayantoninon aderebekah christianson jose . So [facility] [phone].    ATENCIÓN: Si habla español, tiene a plascencia disposición servicios gratuitos de asistencia lingüística. Llame al [phone].    We comply with applicable federal civil rights laws and [place] laws. We do not discriminate on the basis of race, color, national origin, age, disability, sex, sexual orientation, or gender identity.            Thank you!     Thank you for choosing [facility]  for your care. Our goal is always to provide you with excellent care. Hearing back from our patients is one way we can continue to improve our services. Please take a few minutes to complete the written survey that you may receive in the mail after your visit with us. Thank you!             Your Updated Medication List - Protect others around you: Learn how to safely use, store and throw away your medicines at www.disposemymeds.org.          This list is accurate as of 6/5/18  5:34 PM.  Always use your most recent med list.                   Brand Name Dispense Instructions for use Diagnosis    albuterol 108 (90 Base) MCG/ACT Inhaler    PROAIR HFA/PROVENTIL HFA/VENTOLIN HFA    1 Inhaler    Inhale 2-4 puffs into the lungs every 4 hours as needed for shortness of breath / dyspnea or wheezing    Viral URI with cough, Bronchospasm       ALPRAZolam 0.25 MG tablet    XANAX    15 tablet    Take 1 tablet (0.25 mg) by mouth 3 times daily as needed for anxiety    Anxiety       citalopram 20 MG tablet    celeXA    30 tablet    Take 1 tablet (20 mg) by mouth daily    Moderate major depression (H), Anxiety       doxycycline 100 MG tablet     VIBRA-TABS    14 tablet    Take 1 tablet (100 mg) by mouth 2 times daily    Chlamydia contact

## 2018-06-05 NOTE — PATIENT INSTRUCTIONS
- Avoid unprotected sex until 1 week after treatment  - Follow-up in 2-4 weeks for recheck  Chlamydia  Chlamydia is a very common sexually transmitted disease (STD). Most people do not have symptoms. Because of this, chlamydia may not be noticed until it causes severe problems. Left untreated, this infection can cause women and men to become sterile. This means they will not be able to have children.    Symptoms  Many people with chlamydia have no symptoms. Women are more likely than men not to have symptoms.  If symptoms show up in women, they include:    Abnormal vaginal discharge    Bleeding between periods    Pain or burning during urination  If symptoms show up in men, they include:    Clear discharge (drip) from the penis or anus    Pain or burning during urination  These symptoms usually disappear after a few weeks, whether or not you are treated. However, if you are not treated, the chlamydia will still be present and can cause long-term problems.  Potential problems  If the infection is not treated, it can lead to more serious health problems. In women, this can be pelvic inflammatory disease (PID). PID can make a woman sterile. It can also cause an ectopic (tubal) pregnancy. This type of pregnancy cannot be carried to term. Symptoms of PID include fever, pain during sex, and pain in the belly.  Sexually active women should get checked for chlamydia regularly. This can help prevent PID.   Treatment  When found early, chlamydia can be treated. It can be cured with antibiotic medicines. If you have it, tell your partner right away. Because women often don t have symptoms, men should ask their partners to get tested.  Prevention  Know your partner s history. Protect yourself by using a latex condom whenever you have sex. If you are pregnant, take extra care to get proper treatment. Untreated chlamydia in a pregnant woman can pass the infection on to the baby, causing possible eye, ear, or lung problems. There  is also the possibility of a premature delivery.  Resources  American Social Health Association STD Hotline  976.143.3288  www.ashastd.org  Centers for Disease Control and Prevention  520.854.1332  www.cdc.gov/std   Date Last Reviewed: 12/1/2016 2000-2017 [employer]. [address] [place] [address], [place] [address]. All rights reserved. This information is not intended as a substitute for professional medical care. Always follow your healthcare professional's instructions.

## 2018-06-06 LAB
N GONORRHOEA DNA SPEC QL NAA+PROBE: NEGATIVE
SPECIMEN SOURCE: NORMAL

## 2018-06-07 LAB
C TRACH DNA SPEC QL NAA+PROBE: POSITIVE
SPECIMEN SOURCE: ABNORMAL

## 2018-06-08 ENCOUNTER — TELEPHONE (OUTPATIENT)
Dept: URGENT CARE | Facility: URGENT CARE | Age: 23
End: 2018-06-08

## 2018-06-08 NOTE — LETTER
[facility]  [address]  [facility] [address]  Phone: [phone]    06/12/18    Tj Mobley  [address] [place] [address]      To whom it may concern:     The results of your recent lab results were abnormal. Enclosed are a copy of the results. Please call us with any questions/concerns regarding your results. Thank you for choosing [facility]. Have a great day!    Results for orders placed or performed in visit on 06/05/18   NEISSERIA GONORRHOEA PCR   Result Value Ref Range    Specimen Descrip Urine     N Gonorrhea PCR Negative NEG^Negative   CHLAMYDIA TRACHOMATIS PCR   Result Value Ref Range    Specimen Description Urine     Chlamydia Trachomatis PCR Positive (A) NEG^Negative     Please contact the clinic at [phone] and ask for triage nurse to help you. It is very important you contact us. Thank you!    Sincerely,      Aimee Zarate PA-C

## 2018-06-08 NOTE — TELEPHONE ENCOUNTER
Notes Recorded by Aimee Zarate PA-C on 6/8/2018 at 11:00 AM  Results are abnormal. Your Chlamydia test was positive. Gonorrhea was negative. Finish the Doxycycline you were prescribed at your visit on 6/5 and follow up with your Primary in 4 weeks for repeat testing.  Please call patient and notify.    Aimee Zarate PA-C    This writer attempted to contact Tj on 06/08/18      Reason for call abnormal results and left message.      If patient calls back:   Patient contacted by a Registered Nurse. Inform patient that someone from the RN group will contact them, document that pt called and route to P DYAD 3 RN POOL [[id number]]        Michelle Willett RN

## 2018-06-11 NOTE — TELEPHONE ENCOUNTER
This writer attempted to contact Tj on 06/11/18      Reason for call results and left message.      If patient calls back:   Patient contacted by a Registered Nurse. Inform patient that someone from the RN group will contact them, document that pt called and route to P DYAD 3 RN POOL [[id number]]    Akbar Espitia RN, BSN

## 2018-06-12 NOTE — TELEPHONE ENCOUNTER
Please send certified letter with instructions regarding abnormal lab result, as staff reports unable to contact pt after multiple attempts.

## 2018-06-12 NOTE — TELEPHONE ENCOUNTER
This writer attempted to contact Tj on 06/12/18      Reason for call results and left message.      If patient calls back:   Patient contacted by a Registered Nurse. Inform patient that someone from the RN group will contact them, document that pt called and route to P DYAD 3 RN POOL [[id number]]    Akbar Espitia RN, BSN    Three attempts have been made to contact patient. Routing back to provider to advise if letter should be sent at this time with results to patient.

## 2018-06-14 NOTE — TELEPHONE ENCOUNTER
Patient returned call    Best number to reach caller: Home number on file [phone] (home)    Is it ok to leave a detailed message: YES

## 2018-06-14 NOTE — TELEPHONE ENCOUNTER
Called and spoke with patient. Advised of positive gonorrhea test and to make sure completes Abx prescribed at OV with  provider. Pt verbalized understanding and stated he has already completed the course and did take all tablets. RN advised of no sexual activity for 1 week after treatment and to inform all sexual partners to be tested. Advised for pt to follow up with primary care in 3 more weeks to be re-tested and make sure infection has been fully treated. Pt understanding and agreed. No questions or concerns at this time.    Belinda Dahl RN  [facility]

## 2018-07-19 ENCOUNTER — OFFICE VISIT (OUTPATIENT)
Dept: FAMILY MEDICINE | Facility: CLINIC | Age: 23
End: 2018-07-19
Payer: COMMERCIAL

## 2018-07-19 VITALS
BODY MASS INDEX: 35.49 KG/M2 | WEIGHT: 262 LBS | SYSTOLIC BLOOD PRESSURE: 137 MMHG | TEMPERATURE: 98.7 F | RESPIRATION RATE: 20 BRPM | DIASTOLIC BLOOD PRESSURE: 88 MMHG | HEIGHT: 72 IN | HEART RATE: 87 BPM | OXYGEN SATURATION: 95 %

## 2018-07-19 DIAGNOSIS — R07.0 THROAT PAIN: ICD-10-CM

## 2018-07-19 DIAGNOSIS — J20.9 ACUTE BRONCHITIS, UNSPECIFIED ORGANISM: Primary | ICD-10-CM

## 2018-07-19 LAB
DEPRECATED S PYO AG THROAT QL EIA: NORMAL
SPECIMEN SOURCE: NORMAL

## 2018-07-19 PROCEDURE — 87081 CULTURE SCREEN ONLY: CPT | Performed by: PHYSICIAN ASSISTANT

## 2018-07-19 PROCEDURE — 87880 STREP A ASSAY W/OPTIC: CPT | Performed by: PHYSICIAN ASSISTANT

## 2018-07-19 PROCEDURE — 99213 OFFICE O/P EST LOW 20 MIN: CPT | Performed by: PHYSICIAN ASSISTANT

## 2018-07-19 RX ORDER — GUAIFENESIN AND DEXTROMETHORPHAN HYDROBROMIDE 1200; 60 MG/1; MG/1
1 TABLET, EXTENDED RELEASE ORAL 2 TIMES DAILY
Qty: 28 TABLET | Refills: 0 | Status: SHIPPED | OUTPATIENT
Start: 2018-07-19 | End: 2020-10-05

## 2018-07-19 RX ORDER — PREDNISONE 20 MG/1
40 TABLET ORAL DAILY
Qty: 10 TABLET | Refills: 0 | Status: SHIPPED | OUTPATIENT
Start: 2018-07-19 | End: 2018-07-24

## 2018-07-19 RX ORDER — ALBUTEROL SULFATE 90 UG/1
2-4 AEROSOL, METERED RESPIRATORY (INHALATION) EVERY 4 HOURS PRN
Qty: 1 INHALER | Refills: 2 | Status: SHIPPED | OUTPATIENT
Start: 2018-07-19 | End: 2020-10-05

## 2018-07-19 ASSESSMENT — PAIN SCALES - GENERAL: PAINLEVEL: EXTREME PAIN (8)

## 2018-07-19 NOTE — MR AVS SNAPSHOT
After Visit Summary   7/19/2018    Tj Mobley    MRN: [id number]           Patient Information     Date Of Birth          1995        Visit Information        Provider Department      7/19/2018 3:00 PM Janessa Shaw PA-C [facility]        Today's Diagnoses     Acute bronchitis, unspecified organism    -  1    Throat pain          Care Instructions    Take Albuterol 2 puffs every 4 hours as needed for wheezing shortness of breath   Prednisone 40 mg (2 tablets ) daily for 5 days   Mucinex DM 1 tablet twice a day for mucous and cough for 10 days   Drink more water daily   Follow up if worsens   What Is Acute Bronchitis?  Acute bronchitis is when the airways in your lungs (bronchial tubes) become red and swollen (inflamed). It is usually caused by a viral infection. But it can also occur because of a bacteria or allergen. Symptoms include a cough that produces yellow or greenish mucus and can last for days or sometimes weeks.  Inside healthy lungs    Air travels in and out of the lungs through the airways. The linings of these airways produce sticky mucus. This mucus traps particles that enter the lungs. Tiny structures called cilia then sweep the particles out of the airways.     Healthy airway: Airways are normally open. Air moves in and out easily.      Healthy cilia: Tiny, hairlike cilia sweep mucus and particles up and out of the airways.     Lungs with bronchitis  Bronchitis often occurs with a cold or the flu virus. The airways become inflamed (red and swollen). There is a deep hacking cough from the extra mucus. Other symptoms may include:    Wheezing    Chest discomfort    Shortness of breath    Mild fever  A second infection, this time due to bacteria, may then occur. And airways irritated by allergens or smoke are more likely to get infected.        Inflamed airway: Inflammation and extra mucus narrow the airway, causing shortness of breath.       Impaired cilia: Extra mucus impairs cilia, causing congestion and wheezing. Smoking makes the problem worse.     Making a diagnosis  A physical exam, health history, and certain tests help your healthcare provider make the diagnosis.  Health history  Your healthcare provider will ask you about your symptoms.  The exam  Your provider listens to your chest for signs of congestion. He or she may also check your ears, nose, and throat.  Possible tests    A sputum test for bacteria. This requires a sample of mucus from your lungs.    A nasal or throat swab. This tests to see if you have a bacterial infection.    A chest X-ray. This is done if your healthcare provider thinks you have pneumonia.    Tests to check for an underlying condition. Other tests may be done to check for things such as allergies, asthma, or COPD (chronic obstructive pulmonary disease). You may need to see a specialist for more lung function testing.  Treating a cough  The main treatment for bronchitis is easing symptoms. Avoiding smoke, allergens, and other things that trigger coughing can often help. If the infection is bacterial, you may be given antibiotics. During the illness, it's important to get plenty of sleep. To ease symptoms:    Don t smoke. Also avoid secondhand smoke.    Use a humidifier. Or try breathing in steam from a hot shower. This may help loosen mucus.    Drink a lot of water and juice. They can soothe the throat and may help thin mucus.    Sit up or use extra pillows when in bed. This helps to lessen coughing and congestion.    Ask your provider about using medicine. Ask about using cough medicine, pain and fever medicine, or a decongestant.  Antibiotics  Most cases of bronchitis are caused by cold or flu viruses. They don t need antibiotics to treat them, even if your mucus is thick and green or yellow. Antibiotics don t treat viral illness and antibiotics have not been shown to have any benefit in cases of acute bronchitis.  Taking antibiotics when they are not needed increases your risk of getting an infection later that is antibiotic-resistant. Antibiotics can also cause severe cases of diarrhea that require other antibiotics to treat.  It is important that you accept your healthcare provider's opinion to not use antibiotics. Your provider will prescribe antibiotics if the infection is caused by bacteria. If they are prescribed:    Take all of the medicine. Take the medicine until it is used up, even if symptoms have improved. If you don t, the bronchitis may come back.    Take the medicines as directed. For instance, some medicines should be taken with food.    Ask about side effects. Ask your provider or pharmacist what side effects are common, and what to do about them.  Follow-up care  You should see your provider again in 2 to 3 weeks. By this time, symptoms should have improved. An infection that lasts longer may mean you have a more serious problem.  Prevention    Avoid tobacco smoke. If you smoke, quit. Stay away from smoky places. Ask friends and family not to smoke around you, or in your home or car.    Get checked for allergies.    Ask your provider about getting a yearly flu shot. Also ask about pneumococcal or pneumonia shots.    Wash your hands often. This helps reduce the chance of picking up viruses that cause colds and flu.  Call your healthcare provider if:    Symptoms worsen, or you have new symptoms    Breathing problems worsen or  become severe    Symptoms don t get better within a week, or within 3 days of taking antibiotics   Date Last Reviewed: 2/1/2017 2000-2017 [employer]. [address] [place] [address], [place] [address]. All rights reserved. This information is not intended as a substitute for professional medical care. Always follow your healthcare professional's instructions.                Follow-ups after your visit        Who to contact     If you have questions or need follow up information  "about today's clinic visit or your schedule please contact [facility] directly at [phone].  Normal or non-critical lab and imaging results will be communicated to you by [employer]aredhart, letter or phone within 4 business days after the clinic has received the results. If you do not hear from us within 7 days, please contact the clinic through [employer] or phone. If you have a critical or abnormal lab result, we will notify you by phone as soon as possible.  Submit refill requests through [employer] or call your pharmacy and they will forward the refill request to us. Please allow 3 business days for your refill to be completed.          Additional Information About Your Visit        [employer] Information     [employer]wise lets you send messages to your doctor, view your test results, renew your prescriptions, schedule appointments and more. To sign up, go to [email or web address] . Click on \"Log in\" on the left side of the screen, which will take you to the Welcome page. Then click on \"Sign up Now\" on the right side of the page.     You will be asked to enter the access code listed below, as well as some personal information. Please follow the directions to create your username and password.     Your access code is: [email or web address]  Expires: 10/17/2018  3:42 PM     Your access code will  in 90 days. If you need help or a new code, please call your [facility] clinic or [phone].        Care EveryWhere ID     This is your Care EveryWhere ID. This could be used by other organizations to access your [facility] medical records  [id number]        Your Vitals Were     Pulse Temperature Respirations Height Pulse Oximetry BMI (Body Mass Index)    87 98.7  F (37.1  C) (Oral) 20 1.829 m (6') 95% 35.53 kg/m2       Blood Pressure from Last 3 Encounters:   18 137/88   18 144/88   18 135/87    Weight from Last 3 Encounters:   18 118.8 kg (262 lb)   18 115.2 kg (254 lb)   18 112.4 kg " (247 lb 12.8 oz)              We Performed the Following     Beta strep group A culture     Strep, Rapid Screen          Today's Medication Changes          These changes are accurate as of 7/19/18  3:42 PM.  If you have any questions, ask your nurse or doctor.               Start taking these medicines.        Dose/Directions    Dextromethorphan-Guaifenesin  MG Tb12   Used for:  Acute bronchitis, unspecified organism   Started by:  Janessa Shaw PA-C        Dose:  1 tablet   Take 1 tablet by mouth 2 times daily   Quantity:  28 tablet   Refills:  0       predniSONE 20 MG tablet   Commonly known as:  DELTASONE   Used for:  Acute bronchitis, unspecified organism   Started by:  Janessa Shaw PA-C        Dose:  40 mg   Take 2 tablets (40 mg) by mouth daily for 5 days   Quantity:  10 tablet   Refills:  0            Where to get your medicines      These medications were sent to [facility] [address] [place] [address] [address] [facility] [address]  [address] [place] [address]     Phone:  [phone]     albuterol 108 (90 Base) MCG/ACT Inhaler    Dextromethorphan-Guaifenesin  MG Tb12    predniSONE 20 MG tablet                Primary Care Provider Office Phone # Fax #    Lobo Berman [phone]       [address]  [facility] [address]        Equal Access to Services     [facility] AH: Hadii aad ku hadasho Soomaali, waaxda luqadaha, qaybta kaalmada adeegyada, waxay demarcoin hayaan roger garcia ah. So [facility] [phone].    ATENCIÓN: Si habla español, tiene a plascencia disposición servicios gratuitos de asistencia lingüística. [place] al [phone].    We comply with applicable federal civil rights laws and [place] laws. We do not discriminate on the basis of race, color, national origin, age, disability, sex, sexual orientation, or gender identity.            Thank you!     Thank you for choosing [facility]  for your care. Our goal is always to provide you with excellent care. Hearing back from our patients is one way we can continue to improve our services. Please take a few minutes to complete the written survey that you may receive in the mail after your visit with us. Thank you!             Your Updated Medication List - Protect others around you: Learn how to safely use, store and throw away your medicines at www.disposemymeds.org.          This list is accurate as of 7/19/18  3:42 PM.  Always use your most recent med list.                   Brand Name Dispense Instructions for use Diagnosis    albuterol 108 (90 Base) MCG/ACT Inhaler    PROAIR HFA/PROVENTIL HFA/VENTOLIN HFA    1 Inhaler    Inhale 2-4 puffs into the lungs every 4 hours as needed for shortness of breath / dyspnea or wheezing        ALPRAZolam 0.25 MG tablet    XANAX    15 tablet    Take 1 tablet (0.25 mg) by mouth 3 times daily as needed for anxiety    Anxiety       citalopram 20 MG tablet    celeXA    30 tablet    Take 1 tablet (20 mg) by mouth daily    Moderate major depression (H), Anxiety       Dextromethorphan-Guaifenesin  MG Tb12     28 tablet    Take 1 tablet by mouth 2 times daily    Acute bronchitis, unspecified organism       doxycycline 100 MG tablet    VIBRA-TABS    14 tablet    Take 1 tablet (100 mg) by mouth 2 times daily    Chlamydia contact       predniSONE 20 MG tablet    DELTASONE    10 tablet    Take 2 tablets (40 mg) by mouth daily for 5 days    Acute bronchitis, unspecified organism

## 2018-07-19 NOTE — PROGRESS NOTES
SUBJECTIVE:   Tj Mobley is a 22 year old male who presents to clinic today for the following health issues:    Acute Illness   Acute illness concerns: fever  Onset: 1 day     Fever: YES    Chills/Sweats: YES    Headache (location?): YES    Sinus Pressure:YES    Conjunctivitis:  no    Ear Pain: no    Rhinorrhea: YES    Congestion: YES    Sore Throat: YES     Cough: YES-non-productive    Wheeze: YES    Decreased Appetite: YES    Nausea: YES    Vomiting: no    Diarrhea:  YES    Dysuria/Freq.: YES- freq    Fatigue/Achiness: YES    Sick/Strep Exposure: YES- girl friend      Therapies Tried and outcome: ibuprofen  and mucinex; some relief       Problem list and histories reviewed & adjusted, as indicated.  Additional history: as documented    Patient Active Problem List   Diagnosis     Obesity peds (BMI >=95 percentile)     CARDIOVASCULAR SCREENING; LDL GOAL LESS THAN 160     Migraine without aura and without status migrainosus, not intractable     Post-concussion headache     Obesity (BMI 30.0-34.9)     Anxiety     Moderate major depression (H)     History reviewed. No pertinent surgical history.    Social History   Substance Use Topics     Smoking status: Current Every Day Smoker     Packs/day: 1.00     Types: Cigarettes     Smokeless tobacco: Never Used     Alcohol use No     History reviewed. No pertinent family history.      Current Outpatient Prescriptions   Medication Sig Dispense Refill     albuterol (PROAIR HFA/PROVENTIL HFA/VENTOLIN HFA) 108 (90 Base) MCG/ACT Inhaler Inhale 2-4 puffs into the lungs every 4 hours as needed for shortness of breath / dyspnea or wheezing 1 Inhaler 2     ALPRAZolam (XANAX) 0.25 MG tablet Take 1 tablet (0.25 mg) by mouth 3 times daily as needed for anxiety 15 tablet 0     citalopram (CELEXA) 20 MG tablet Take 1 tablet (20 mg) by mouth daily 30 tablet 1     Dextromethorphan-Guaifenesin  MG TB12 Take 1 tablet by mouth 2 times daily 28 tablet 0     doxycycline (VIBRA-TABS)  100 MG tablet Take 1 tablet (100 mg) by mouth 2 times daily 14 tablet 0     predniSONE (DELTASONE) 20 MG tablet Take 2 tablets (40 mg) by mouth daily for 5 days 10 tablet 0     [DISCONTINUED] albuterol (PROAIR HFA/PROVENTIL HFA/VENTOLIN HFA) 108 (90 BASE) MCG/ACT Inhaler Inhale 2-4 puffs into the lungs every 4 hours as needed for shortness of breath / dyspnea or wheezing 1 Inhaler 2     Allergies   Allergen Reactions     Penicillins      Sulfa Drugs        Reviewed and updated as needed this visit by clinical staff  Tobacco  Allergies  Meds  Problems  Med Hx  Surg Hx  Fam Hx  Soc Hx        Reviewed and updated as needed this visit by Provider  Allergies  Meds  Problems         ROS:  Constitutional, HEENT, cardiovascular, pulmonary, GI, , musculoskeletal, neuro, skin, endocrine and psych systems are negative, except as otherwise noted.    OBJECTIVE:     /88 (BP Location: Right arm, Patient Position: Sitting, Cuff Size: Adult Large)  Pulse 87  Temp 98.7  F (37.1  C) (Oral)  Resp 20  Ht 1.829 m (6')  Wt 118.8 kg (262 lb)  SpO2 95%  BMI 35.53 kg/m2  Body mass index is 35.53 kg/(m^2).  GENERAL: healthy, alert and no distress  EYES: Eyes grossly normal to inspection, PERRL and conjunctivae and sclerae normal  HENT: normal cephalic/atraumatic, ear canals and TM's normal, rhinorrhea clear, oral mucous membranes moist and tonsillar erythema  NECK: no adenopathy, no asymmetry, masses, or scars and thyroid normal to palpation  RESP: lungs  to auscultation - no rales, no rhonchi. Expiratory wheezing is present throughout   CV: regular rate and rhythm, normal S1 S2, no S3 or S4, no murmur, click or rub, no peripheral edema and peripheral pulses strong  ABDOMEN: soft, nontender, no hepatosplenomegaly, no masses and bowel sounds normal  MS: no gross musculoskeletal defects noted, no edema    Diagnostic Test Results:  Results for orders placed or performed in visit on 07/19/18 (from the past 24 hour(s))    Strep, Rapid Screen   Result Value Ref Range    Specimen Description Throat     Rapid Strep A Screen       NEGATIVE: No Group A streptococcal antigen detected by immunoassay, await culture report.       ASSESSMENT/PLAN:         ICD-10-CM    1. Acute bronchitis, unspecified organism J20.9 predniSONE (DELTASONE) 20 MG tablet     Dextromethorphan-Guaifenesin  MG TB12   2. Throat pain R07.0 Strep, Rapid Screen     Beta strep group A culture     Take Albuterol 2 puffs every 4 hours as needed for wheezing shortness of breath   Prednisone 40 mg (2 tablets ) daily for 5 days   Mucinex DM 1 tablet twice a day for mucous and cough for 10 days   Drink more water daily   Follow up if worsens       Janessa Shaw PA-C  [facility]

## 2018-07-19 NOTE — PATIENT INSTRUCTIONS
Take Albuterol 2 puffs every 4 hours as needed for wheezing shortness of breath   Prednisone 40 mg (2 tablets ) daily for 5 days   Mucinex DM 1 tablet twice a day for mucous and cough for 10 days   Drink more water daily   Follow up if worsens   What Is Acute Bronchitis?  Acute bronchitis is when the airways in your lungs (bronchial tubes) become red and swollen (inflamed). It is usually caused by a viral infection. But it can also occur because of a bacteria or allergen. Symptoms include a cough that produces yellow or greenish mucus and can last for days or sometimes weeks.  Inside healthy lungs    Air travels in and out of the lungs through the airways. The linings of these airways produce sticky mucus. This mucus traps particles that enter the lungs. Tiny structures called cilia then sweep the particles out of the airways.     Healthy airway: Airways are normally open. Air moves in and out easily.      Healthy cilia: Tiny, hairlike cilia sweep mucus and particles up and out of the airways.     Lungs with bronchitis  Bronchitis often occurs with a cold or the flu virus. The airways become inflamed (red and swollen). There is a deep hacking cough from the extra mucus. Other symptoms may include:    Wheezing    Chest discomfort    Shortness of breath    Mild fever  A second infection, this time due to bacteria, may then occur. And airways irritated by allergens or smoke are more likely to get infected.        Inflamed airway: Inflammation and extra mucus narrow the airway, causing shortness of breath.      Impaired cilia: Extra mucus impairs cilia, causing congestion and wheezing. Smoking makes the problem worse.     Making a diagnosis  A physical exam, health history, and certain tests help your healthcare provider make the diagnosis.  Health history  Your healthcare provider will ask you about your symptoms.  The exam  Your provider listens to your chest for signs of congestion. He or she may also check your  ears, nose, and throat.  Possible tests    A sputum test for bacteria. This requires a sample of mucus from your lungs.    A nasal or throat swab. This tests to see if you have a bacterial infection.    A chest X-ray. This is done if your healthcare provider thinks you have pneumonia.    Tests to check for an underlying condition. Other tests may be done to check for things such as allergies, asthma, or COPD (chronic obstructive pulmonary disease). You may need to see a specialist for more lung function testing.  Treating a cough  The main treatment for bronchitis is easing symptoms. Avoiding smoke, allergens, and other things that trigger coughing can often help. If the infection is bacterial, you may be given antibiotics. During the illness, it's important to get plenty of sleep. To ease symptoms:    Don t smoke. Also avoid secondhand smoke.    Use a humidifier. Or try breathing in steam from a hot shower. This may help loosen mucus.    Drink a lot of water and juice. They can soothe the throat and may help thin mucus.    Sit up or use extra pillows when in bed. This helps to lessen coughing and congestion.    Ask your provider about using medicine. Ask about using cough medicine, pain and fever medicine, or a decongestant.  Antibiotics  Most cases of bronchitis are caused by cold or flu viruses. They don t need antibiotics to treat them, even if your mucus is thick and green or yellow. Antibiotics don t treat viral illness and antibiotics have not been shown to have any benefit in cases of acute bronchitis. Taking antibiotics when they are not needed increases your risk of getting an infection later that is antibiotic-resistant. Antibiotics can also cause severe cases of diarrhea that require other antibiotics to treat.  It is important that you accept your healthcare provider's opinion to not use antibiotics. Your provider will prescribe antibiotics if the infection is caused by bacteria. If they are  prescribed:    Take all of the medicine. Take the medicine until it is used up, even if symptoms have improved. If you don t, the bronchitis may come back.    Take the medicines as directed. For instance, some medicines should be taken with food.    Ask about side effects. Ask your provider or pharmacist what side effects are common, and what to do about them.  Follow-up care  You should see your provider again in 2 to 3 weeks. By this time, symptoms should have improved. An infection that lasts longer may mean you have a more serious problem.  Prevention    Avoid tobacco smoke. If you smoke, quit. Stay away from smoky places. Ask friends and family not to smoke around you, or in your home or car.    Get checked for allergies.    Ask your provider about getting a yearly flu shot. Also ask about pneumococcal or pneumonia shots.    Wash your hands often. This helps reduce the chance of picking up viruses that cause colds and flu.  Call your healthcare provider if:    Symptoms worsen, or you have new symptoms    Breathing problems worsen or  become severe    Symptoms don t get better within a week, or within 3 days of taking antibiotics   Date Last Reviewed: 2/1/2017 2000-2017 [employer]. [address] [place] [address], [place] [address]. All rights reserved. This information is not intended as a substitute for professional medical care. Always follow your healthcare professional's instructions.

## 2018-07-20 LAB
BACTERIA SPEC CULT: NORMAL
SPECIMEN SOURCE: NORMAL

## 2020-01-19 ENCOUNTER — ANCILLARY PROCEDURE (OUTPATIENT)
Dept: GENERAL RADIOLOGY | Facility: CLINIC | Age: 25
End: 2020-01-19
Attending: FAMILY MEDICINE
Payer: COMMERCIAL

## 2020-01-19 ENCOUNTER — OFFICE VISIT (OUTPATIENT)
Dept: URGENT CARE | Facility: URGENT CARE | Age: 25
End: 2020-01-19
Payer: COMMERCIAL

## 2020-01-19 VITALS
HEART RATE: 94 BPM | SYSTOLIC BLOOD PRESSURE: 140 MMHG | BODY MASS INDEX: 39.15 KG/M2 | RESPIRATION RATE: 14 BRPM | DIASTOLIC BLOOD PRESSURE: 86 MMHG | OXYGEN SATURATION: 95 % | TEMPERATURE: 98.2 F | WEIGHT: 288.7 LBS

## 2020-01-19 DIAGNOSIS — R05.9 COUGH: ICD-10-CM

## 2020-01-19 DIAGNOSIS — J18.9 COMMUNITY ACQUIRED PNEUMONIA OF RIGHT LOWER LOBE OF LUNG: Primary | ICD-10-CM

## 2020-01-19 LAB
FLUAV+FLUBV AG SPEC QL: NEGATIVE
FLUAV+FLUBV AG SPEC QL: NEGATIVE
SPECIMEN SOURCE: NORMAL

## 2020-01-19 PROCEDURE — 71046 X-RAY EXAM CHEST 2 VIEWS: CPT

## 2020-01-19 PROCEDURE — 87804 INFLUENZA ASSAY W/OPTIC: CPT | Performed by: FAMILY MEDICINE

## 2020-01-19 PROCEDURE — 99214 OFFICE O/P EST MOD 30 MIN: CPT | Performed by: FAMILY MEDICINE

## 2020-01-19 RX ORDER — PREDNISONE 20 MG/1
40 TABLET ORAL DAILY
Qty: 14 TABLET | Refills: 0 | Status: SHIPPED | OUTPATIENT
Start: 2020-01-19 | End: 2020-01-26

## 2020-01-19 RX ORDER — AZITHROMYCIN 250 MG/1
TABLET, FILM COATED ORAL
Qty: 6 TABLET | Refills: 0 | Status: SHIPPED | OUTPATIENT
Start: 2020-01-19 | End: 2020-01-24

## 2020-01-19 RX ORDER — CEFDINIR 300 MG/1
300 CAPSULE ORAL 2 TIMES DAILY
Qty: 20 CAPSULE | Refills: 0 | Status: SHIPPED | OUTPATIENT
Start: 2020-01-19 | End: 2020-01-29

## 2020-01-19 NOTE — PATIENT INSTRUCTIONS
Patient Education     Pneumonia (Adult)  Pneumonia is an infection deep within the lungs. It is in the small air sacs (alveoli). Pneumonia may be caused by a virus or bacteria. Pneumonia caused by bacteria is usually treated with an antibiotic. Severe cases may need to be treated in the hospital. Milder cases can be treated at home. Symptoms usually start to get better during the first 2 days of treatment.    Home care  Follow these guidelines when caring for yourself at home:    Rest at home for the first 2 to 3 days, or until you feel stronger. Don t let yourself get overly tired when you go back to your activities.    Stay away from cigarette smoke - yours or other people s.    You may use acetaminophen or ibuprofen to control fever or pain, unless another medicine was prescribed. If you have chronic liver or kidney disease, talk with your healthcare provider before using these medicines. Also talk with your provider if you ve had a stomach ulcer or gastrointestinal bleeding. Don t give aspirin to anyone younger than 18 years of age who is ill with a fever. It may cause severe liver damage.    Your appetite may be poor, so a light diet is fine.    Drink 6 to 8 glasses of fluids every day to make sure you are getting enough fluids. Beverages can include water, sport drinks, sodas without caffeine, juices, tea, or soup. Fluids will help loosen secretions in the lung. This will make it easier for you to cough up the phlegm (sputum). If you also have heart or kidney disease, check with your healthcare provider before you drink extra fluids.    Take antibiotic medicine prescribed until it is all gone, even if you are feeling better after a few days.  Follow-up care  Follow up with your healthcare provider in the next 2 to 3 days, or as advised. This is to be sure the medicine is helping you get better.  If you are 65 or older, you should get a pneumococcal vaccine and a yearly flu (influenza) shot. You should also  get these vaccines if you have chronic lung disease like asthma, emphysema, or COPD. Recently, a second type of pneumonia vaccine has become available for everyone over 65 years old. This is in addition to the previous vaccine. Ask your provider about this.  When to seek medical advice  Call your healthcare provider right away if any of these occur:    You don t get better within the first 48 hours of treatment    Shortness of breath gets worse    Rapid breathing (more than 25 breaths per minute)    Coughing up blood    Chest pain gets worse with breathing    Fever of 100.4 F (38 C) or higher that doesn t get better with fever medicine    Weakness, dizziness, or fainting that gets worse    Thirst or dry mouth that gets worse    Sinus pain, headache, or a stiff neck    Chest pain not caused by coughing  Date Last Reviewed: 1/1/2017 2000-2019 [email or web address]. [address] [place] [address], [place] [address]. All rights reserved. This information is not intended as a substitute for professional medical care. Always follow your healthcare professional's instructions.

## 2020-01-19 NOTE — PROGRESS NOTES
Subjective     Tj Mobley is a 24 year old male who presents to clinic today for the following health issues:    HPI   Chief Complaint   Patient presents with     Cough       Duration: 4 days     Description (location/character/radiation): fever, chills, cough, congestion, wheezing     Intensity:  moderate    Accompanying signs and symptoms: none     History (similar episodes/previous evaluation): asthma     Precipitating or alleviating factors: None    Therapies tried and outcome: OTC analgesia, inhaler          Patient Active Problem List   Diagnosis     Obesity peds (BMI >=95 percentile)     CARDIOVASCULAR SCREENING; LDL GOAL LESS THAN 160     Migraine without aura and without status migrainosus, not intractable     Post-concussion headache     Obesity (BMI 30.0-34.9)     Anxiety     Moderate major depression (H)     History reviewed. No pertinent surgical history.    Social History     Tobacco Use     Smoking status: Current Every Day Smoker     Packs/day: 1.00     Types: Cigarettes     Smokeless tobacco: Never Used   Substance Use Topics     Alcohol use: No     History reviewed. No pertinent family history.      Current Outpatient Medications   Medication Sig Dispense Refill     albuterol (PROAIR HFA/PROVENTIL HFA/VENTOLIN HFA) 108 (90 Base) MCG/ACT Inhaler Inhale 2-4 puffs into the lungs every 4 hours as needed for shortness of breath / dyspnea or wheezing 1 Inhaler 2     azithromycin (ZITHROMAX) 250 MG tablet Take 2 tablets (500 mg) by mouth daily for 1 day, THEN 1 tablet (250 mg) daily for 4 days. 6 tablet 0     cefdinir (OMNICEF) 300 MG capsule Take 1 capsule (300 mg) by mouth 2 times daily for 10 days 20 capsule 0     predniSONE (DELTASONE) 20 MG tablet Take 2 tablets (40 mg) by mouth daily for 7 days 14 tablet 0     ALPRAZolam (XANAX) 0.25 MG tablet Take 1 tablet (0.25 mg) by mouth 3 times daily as needed for anxiety (Patient not taking: Reported on 1/19/2020) 15 tablet 0     citalopram (CELEXA) 20 MG  tablet Take 1 tablet (20 mg) by mouth daily (Patient not taking: Reported on 1/19/2020) 30 tablet 1     Dextromethorphan-Guaifenesin  MG TB12 Take 1 tablet by mouth 2 times daily (Patient not taking: Reported on 1/19/2020) 28 tablet 0     doxycycline (VIBRA-TABS) 100 MG tablet Take 1 tablet (100 mg) by mouth 2 times daily (Patient not taking: Reported on 1/19/2020) 14 tablet 0     Allergies   Allergen Reactions     Penicillins      Sulfa Drugs      Recent Labs   Lab Test 03/15/16  1101   LDL 10   HDL 56   TRIG 132*      BP Readings from Last 3 Encounters:   01/19/20 (!) 140/86   07/19/18 137/88   06/05/18 144/88    Wt Readings from Last 3 Encounters:   01/19/20 131 kg (288 lb 11.2 oz)   07/19/18 118.8 kg (262 lb)   06/05/18 115.2 kg (254 lb)                    Reviewed and updated as needed this visit by Provider         Review of Systems   ROS COMP: Constitutional, HEENT, cardiovascular, pulmonary, GI, , musculoskeletal, neuro, skin, endocrine and psych systems are negative, except as otherwise noted.      Objective    BP (!) 140/86   Pulse 94   Temp 98.2  F (36.8  C) (Oral)   Resp 14   Wt 131 kg (288 lb 11.2 oz)   SpO2 95%   BMI 39.15 kg/m    Body mass index is 39.15 kg/m .  Physical Exam   GENERAL: alert and some diaphoresis  EYES: Eyes grossly normal to inspection, PERRL and conjunctivae and sclerae normal  HENT: normal cephalic/atraumatic, ear canals and TM's normal, nose and mouth without ulcers or lesions, oropharynx clear and oral mucous membranes moist  NECK: no adenopathy, no asymmetry, masses, or scars and thyroid normal to palpation  RESP: Right basal rhonchi with crackles, expiratory wheeze bilaterally  CV: regular rate and rhythm, normal S1 S2, no S3 or S4, no murmur, click or rub, no peripheral edema and peripheral pulses strong  ABDOMEN: soft, nontender, no hepatosplenomegaly, no masses and bowel sounds normal  MS: no gross musculoskeletal defects noted, no edema  SKIN: no suspicious  lesions or rashes    Diagnostic Test Results:  Results for orders placed or performed in visit on 01/19/20 (from the past 24 hour(s))   Influenza A/B antigen   Result Value Ref Range    Influenza A/B Agn Specimen Nasal     Influenza A Negative NEG^Negative    Influenza B Negative NEG^Negative           Assessment & Plan     (J18.1) Community acquired pneumonia of right lower lobe of lung (H)  (primary encounter diagnosis)  Comment: Suspect symptoms secondary to community-acquired pneumonia.  X-ray findings reviewed which although came back negative.  History of asthma, physical examination remarkable for diaphoresis and right basilar rhonchi.  Omnicef, azithromycin and prednisone prescribed, suggested to continue albuterol inhaler, well hydration, warm fluids.  Follow-up with PCP next week.  Patient understood and in agreement with above plan.  All questions answered.  Plan: cefdinir (OMNICEF) 300 MG capsule, azithromycin        (ZITHROMAX) 250 MG tablet, predniSONE         (DELTASONE) 20 MG tablet      (R05) Cough  Comment:   Plan: Influenza A/B antigen, XR Chest 2 Views               Tobacco Cessation:   reports that he has been smoking cigarettes. He has been smoking about 1.00 pack per day. He has never used smokeless tobacco.  Tobacco Cessation Action Plan: Information offered: Patient not interested at this time        Patient Instructions     Patient Education     Pneumonia (Adult)  Pneumonia is an infection deep within the lungs. It is in the small air sacs (alveoli). Pneumonia may be caused by a virus or bacteria. Pneumonia caused by bacteria is usually treated with an antibiotic. Severe cases may need to be treated in the hospital. Milder cases can be treated at home. Symptoms usually start to get better during the first 2 days of treatment.    Home care  Follow these guidelines when caring for yourself at home:    Rest at home for the first 2 to 3 days, or until you feel stronger. Don t let yourself get  overly tired when you go back to your activities.    Stay away from cigarette smoke - yours or other people s.    You may use acetaminophen or ibuprofen to control fever or pain, unless another medicine was prescribed. If you have chronic liver or kidney disease, talk with your healthcare provider before using these medicines. Also talk with your provider if you ve had a stomach ulcer or gastrointestinal bleeding. Don t give aspirin to anyone younger than 18 years of age who is ill with a fever. It may cause severe liver damage.    Your appetite may be poor, so a light diet is fine.    Drink 6 to 8 glasses of fluids every day to make sure you are getting enough fluids. Beverages can include water, sport drinks, sodas without caffeine, juices, tea, or soup. Fluids will help loosen secretions in the lung. This will make it easier for you to cough up the phlegm (sputum). If you also have heart or kidney disease, check with your healthcare provider before you drink extra fluids.    Take antibiotic medicine prescribed until it is all gone, even if you are feeling better after a few days.  Follow-up care  Follow up with your healthcare provider in the next 2 to 3 days, or as advised. This is to be sure the medicine is helping you get better.  If you are 65 or older, you should get a pneumococcal vaccine and a yearly flu (influenza) shot. You should also get these vaccines if you have chronic lung disease like asthma, emphysema, or COPD. Recently, a second type of pneumonia vaccine has become available for everyone over 65 years old. This is in addition to the previous vaccine. Ask your provider about this.  When to seek medical advice  Call your healthcare provider right away if any of these occur:    You don t get better within the first 48 hours of treatment    Shortness of breath gets worse    Rapid breathing (more than 25 breaths per minute)    Coughing up blood    Chest pain gets worse with breathing    Fever  of 100.4 F (38 C) or higher that doesn t get better with fever medicine    Weakness, dizziness, or fainting that gets worse    Thirst or dry mouth that gets worse    Sinus pain, headache, or a stiff neck    Chest pain not caused by coughing  Date Last Reviewed: 1/1/2017 2000-2019 [employer]. [address] [place] [id number]. All rights reserved. This information is not intended as a substitute for professional medical care. Always follow your healthcare professional's instructions.               Guero Beal MD  [facility]

## 2020-01-19 NOTE — LETTER
January 19, 2020      Tj Mobley  [address] [facility] [address]        To Whom It May Concern:        Tj Mobley was seen in our clinic. Kindly excuse his work absence for tomorrow.           Sincerely,          Guero Beal MD

## 2020-10-05 ENCOUNTER — VIRTUAL VISIT (OUTPATIENT)
Dept: FAMILY MEDICINE | Facility: CLINIC | Age: 25
End: 2020-10-05
Payer: COMMERCIAL

## 2020-10-05 DIAGNOSIS — R09.81 NASAL CONGESTION: ICD-10-CM

## 2020-10-05 DIAGNOSIS — Z20.822 ENCOUNTER FOR LABORATORY TESTING FOR COVID-19 VIRUS: ICD-10-CM

## 2020-10-05 DIAGNOSIS — J45.20 INTERMITTENT ASTHMA WITHOUT COMPLICATION, UNSPECIFIED ASTHMA SEVERITY: Primary | ICD-10-CM

## 2020-10-05 DIAGNOSIS — Z20.822 SUSPECTED 2019 NOVEL CORONAVIRUS INFECTION: ICD-10-CM

## 2020-10-05 PROBLEM — J30.2 SEASONAL ALLERGIC RHINITIS: Status: ACTIVE | Noted: 2020-10-05

## 2020-10-05 PROBLEM — J30.89 PERENNIAL ALLERGIC RHINITIS: Status: ACTIVE | Noted: 2020-10-05

## 2020-10-05 PROBLEM — E66.01 SEVERE OBESITY (BMI 35.0-39.9) WITH COMORBIDITY (H): Status: ACTIVE | Noted: 2018-07-19

## 2020-10-05 PROCEDURE — 99214 OFFICE O/P EST MOD 30 MIN: CPT | Mod: 95 | Performed by: FAMILY MEDICINE

## 2020-10-05 RX ORDER — ALBUTEROL SULFATE 90 UG/1
2 AEROSOL, METERED RESPIRATORY (INHALATION) EVERY 4 HOURS PRN
Qty: 1 INHALER | Refills: 0 | Status: SHIPPED | OUTPATIENT
Start: 2020-10-05 | End: 2021-03-24

## 2020-10-05 NOTE — PROGRESS NOTES
"Tj Mobley is a 24 year old male who is being evaluated via a billable telephone visit.      The patient has been notified of following:     \"This telephone visit will be conducted via a call between you and your physician/provider. We have found that certain health care needs can be provided without the need for a physical exam.  This service lets us provide the care you need with a short phone conversation.  If a prescription is necessary we can send it directly to your pharmacy.  If lab work is needed we can place an order for that and you can then stop by our lab to have the test done at a later time.    Telephone visits are billed at different rates depending on your insurance coverage. During this emergency period, for some insurers they may be billed the same as an in-person visit.  Please reach out to your insurance provider with any questions.    If during the course of the call the physician/provider feels a telephone visit is not appropriate, you will not be charged for this service.\"    Patient has given verbal consent for Telephone visit?  Yes    What phone number would you like to be contacted at? [phone]    How would you like to obtain your AVS? Mail a copy    Subjective     Tj Mobley is a 24 year old male who presents via phone visit today for the following health issues:    HPI     Congestion- last night. Gets bad allergies at this time of year. Took a Claritin this morning.  Feels fine now.  Is out of inhaler and would like an RX for that as well.  Mom made the appt she wants to be sure there is nothing else going on with him.  No other symptoms.     ENT Symptoms             Symptoms: cc Present Absent Comment   Fever/Chills   x    Fatigue   x    Muscle Aches   x    Eye Irritation   x    Sneezing       Nasal Earle/Drg  x     Sinus Pressure/Pain   x    Loss of smell   x    Dental pain   x    Sore Throat   x    Swollen Glands   x    Ear Pain/Fullness   x    Cough  x     Wheeze  x     Chest " Pain   x    Shortness of breath   x    Rash   x    Other         Symptom duration:  Yesterday only   Symptom severity:  Mild   Treatments tried:  Loratadine   Contacts:  None     Past medical, family, and social histories, medications, and allergies are reviewed and updated in Epic.  Allergies: Penicillins and Sulfa drugs    Review Of Systems:   Constitutional, HEENT, cardiovascular, pulmonary, gi and gu systems are negative, except as otherwise noted.    Objective:         There were no vitals taken for this visit., since this is a telephone visit.  healthy, alert and no distress  RESP: No cough, no audible wheezing, able to talk in full sentences.  PSYCH: Alert and oriented times 3; coherent speech, normal   rate and volume, able to articulate logical thoughts, able   to abstract reason, no tangential thoughts, no hallucinations   or delusions, and affect is normal and pleasant  The remainder of the exam cannot be completed due to this being a telephone visit.    =====    ASSESSMENT/PLAN:  (J45.20) Intermittent asthma without complication, unspecified asthma severity  (primary encounter diagnosis)  Comment: He uses his inhaler regularly enough, that he might benefit from a preventive inhaler.  Plan: albuterol (PROAIR HFA/PROVENTIL HFA/VENTOLIN         HFA) 108 (90 Base) MCG/ACT inhaler, Asthma         Action Plan (AAP)        Albuterol refilled.  Recheck ACT in about 5 weeks.  If he scores less than 20, I would recommend additional medicine    (R09.81) Nasal congestion  (Z20.828) Suspected 2019 novel coronavirus infection  (Z20.828) Encounter for laboratory testing for COVID-19 virus  Comment: Possible COVID-19, but he does report seasonal allergies and cat allergy which can explain the same thing.  Plan: Symptomatic COVID-19 Virus (Coronavirus) by PCR        Testing offered and recommended.  Handout.    Phone call duration:  12 minutes    Cuco Phillips MD

## 2020-10-05 NOTE — LETTER
My Asthma Action Plan    Name: Tj Mobley   YOB: 1995  Date: 10/5/2020   My doctor: Cuco Phillips MD   My clinic: [facility]        My Rescue Medicine:   Albuterol inhaler (Proair/Ventolin/Proventil HFA)  2-4 puffs EVERY 4 HOURS as needed. Use a spacer if recommended by your provider.   My Asthma Severity:   Intermittent / Exercise Induced  Know your asthma triggers.             GREEN ZONE   Good Control    I feel good    No cough or wheeze    Can work, sleep and play without asthma symptoms       Take your asthma control medicine every day.     1. If exercise triggers your asthma, take your rescue medication    15 minutes before exercise or sports, and    During exercise if you have asthma symptoms  2. Spacer to use with inhaler: If you have a spacer, make sure to use it with your inhaler             YELLOW ZONE Getting Worse  I have ANY of these:    I do not feel good    Cough or wheeze    Chest feels tight    Wake up at night   1. Keep taking your Green Zone medications  2. Start taking your rescue medicine:    every 20 minutes for up to 1 hour. Then every 4 hours for 24-48 hours.  3. If you stay in the Yellow Zone for more than 12-24 hours, contact your doctor.  4. If you do not return to the Green Zone in 12-24 hours or you get worse, start taking your oral steroid medicine if prescribed by your provider.           RED ZONE Medical Alert - Get Help  I have ANY of these:    I feel awful    Medicine is not helping    Breathing getting harder    Trouble walking or talking    Nose opens wide to breathe       1. Take your rescue medicine NOW  2. If your provider has prescribed an oral steroid medicine, start taking it NOW  3. Call your doctor NOW  4. If you are still in the Red Zone after 20 minutes and you have not reached your doctor:    Take your rescue medicine again and    Call 911 or go to the emergency room right away    See your regular doctor within 2  weeks of an Emergency Room or Urgent Care visit for follow-up treatment.          Annual Reminders:  Meet with Asthma Educator,  Flu Shot in the Fall, consider Pneumonia Vaccination for patients with asthma (aged 19 and older).    Pharmacy: [id number] [id number] AT [facility] [phone]    Electronically signed by Cuco Phillips MD   Date: 10/05/20                    Asthma Triggers  How To Control Things That Make Your Asthma Worse    Triggers are things that make your asthma worse.  Look at the list below to help you find your triggers and   what you can do about them. You can help prevent asthma flare-ups by staying away from your triggers.      Trigger                                                          What you can do   Cigarette Smoke  Tobacco smoke can make asthma worse. Do not allow smoking in your home, car or around you.  Be sure no one smokes at a child s day care or school.  If you smoke, ask your health care provider for ways to help you quit.  Ask family members to quit too.  Ask your health care provider for a referral to Quit Plan to help you quit smoking, or call 0-607-973-PLAN.     Colds, Flu, Bronchitis  These are common triggers of asthma. Wash your hands often.  Don t touch your eyes, nose or mouth.  Get a flu shot every year.     Dust Mites  These are tiny bugs that live in cloth or carpet. They are too small to see. Wash sheets and blankets in hot water every week.   Encase pillows and mattress in dust mite proof covers.  Avoid having carpet if you can. If you have carpet, vacuum weekly.   Use a dust mask and HEPA vacuum.   Pollen and Outdoor Mold  Some people are allergic to trees, grass, or weed pollen, or molds. Try to keep your windows closed.  Limit time out doors when pollen count is high.   Ask you health care provider about taking medicine during allergy season.     Animal Dander  Some people are allergic to skin flakes, urine or  saliva from pets with fur or feathers. Keep pets with fur or feathers out of your home.    If you can t keep the pet outdoors, then keep the pet out of your bedroom.  Keep the bedroom door closed.  Keep pets off cloth furniture and away from stuffed toys.     Mice, Rats, and Cockroaches  Some people are allergic to the waste from these pests.   Cover food and garbage.  Clean up spills and food crumbs.  Store grease in the refrigerator.   Keep food out of the bedroom.   Indoor Mold  This can be a trigger if your home has high moisture. Fix leaking faucets, pipes, or other sources of water.   Clean moldy surfaces.  Dehumidify basement if it is damp and smelly.   Smoke, Strong Odors, and Sprays  These can reduce air quality. Stay away from strong odors and sprays, such as perfume, powder, hair spray, paints, smoke incense, paint, cleaning products, candles and new carpet.   Exercise or Sports  Some people with asthma have this trigger. Be active!  Ask your doctor about taking medicine before sports or exercise to prevent symptoms.    Warm up for 5-10 minutes before and after sports or exercise.     Other Triggers of Asthma  Cold air:  Cover your nose and mouth with a scarf.  Sometimes laughing or crying can be a trigger.  Some medicines and food can trigger asthma.

## 2020-10-06 ASSESSMENT — ASTHMA QUESTIONNAIRES: ACT_TOTALSCORE: 18

## 2020-10-09 ENCOUNTER — AMBULATORY - HEALTHEAST (OUTPATIENT)
Dept: FAMILY MEDICINE | Facility: CLINIC | Age: 25
End: 2020-10-09

## 2020-10-09 DIAGNOSIS — R09.81 NASAL CONGESTION: ICD-10-CM

## 2020-10-09 DIAGNOSIS — Z20.822 SUSPECTED COVID-19 VIRUS INFECTION: ICD-10-CM

## 2020-10-09 DIAGNOSIS — Z20.822 ENCOUNTER FOR LABORATORY TESTING FOR COVID-19 VIRUS: ICD-10-CM

## 2020-10-10 ENCOUNTER — AMBULATORY - HEALTHEAST (OUTPATIENT)
Dept: FAMILY MEDICINE | Facility: CLINIC | Age: 25
End: 2020-10-10

## 2020-10-10 DIAGNOSIS — Z20.822 ENCOUNTER FOR LABORATORY TESTING FOR COVID-19 VIRUS: ICD-10-CM

## 2020-10-10 DIAGNOSIS — R09.81 NASAL CONGESTION: ICD-10-CM

## 2020-10-10 DIAGNOSIS — Z20.822 SUSPECTED COVID-19 VIRUS INFECTION: ICD-10-CM

## 2020-10-12 ENCOUNTER — COMMUNICATION - HEALTHEAST (OUTPATIENT)
Dept: SCHEDULING | Facility: CLINIC | Age: 25
End: 2020-10-12

## 2020-11-24 ENCOUNTER — TELEPHONE (OUTPATIENT)
Dept: FAMILY MEDICINE | Facility: CLINIC | Age: 25
End: 2020-11-24

## 2020-11-24 NOTE — TELEPHONE ENCOUNTER
----- Message -----   From: Cuco Phillips MD   Sent: 11/9/2020   To: [facility]   Subject: asthma                                           Please update ACT. If less than 20, recommend f/u appointment to improve his asthma control

## 2020-11-24 NOTE — LETTER
December 15, 2020      Dear Tj Mobley,      At [facility] we care about your health and are committed to providing quality patient care. Regular appointments are a vital part of the care and management of your health and can help prevent many of the complications that can occur.     It has come to our attention that you are due for Asthma check.  Please contact us through your [employer] account or call [facility] at [phone] soon to schedule your follow up appointment.    If you have transferred care to another clinic please call to inform us so that we do not continue to send you reminder letters.      Sincerely,      [facility] Care Team

## 2020-12-01 NOTE — TELEPHONE ENCOUNTER
This writer attempted to contact patient on 12/01/20      Reason for call update ACT and PHQ-9 and left message.      If patient calls back:   1st floor [place] Care Team (MA/TC) called. Inform patient that someone from the team will contact them, document that pt called and route to care team.         Kalia Padron MA

## 2020-12-09 NOTE — TELEPHONE ENCOUNTER
This writer attempted to contact Tj on 12/08/20    Reason for call update ACT and PHQ9 and left message to return call.    When patient calls back, please contact 1st floor Nikki Ritchie. routine priority.        Wood Finney

## 2020-12-14 NOTE — TELEPHONE ENCOUNTER
This writer attempted to contact patient on 12/14/20      Reason for call udpate act and phq9  and left message.      If patient calls back:   1st floor [place] Care Team (MA/TC) called. Inform patient that someone from the team will contact them, document that pt called and route to care team.         Bruna Diaz MA

## 2021-03-23 ENCOUNTER — E-VISIT (OUTPATIENT)
Dept: URGENT CARE | Facility: URGENT CARE | Age: 26
End: 2021-03-23
Payer: COMMERCIAL

## 2021-03-23 DIAGNOSIS — Z20.822 SUSPECTED COVID-19 VIRUS INFECTION: Primary | ICD-10-CM

## 2021-03-23 PROCEDURE — 99421 OL DIG E/M SVC 5-10 MIN: CPT | Performed by: PREVENTIVE MEDICINE

## 2021-03-23 NOTE — PATIENT INSTRUCTIONS
Dear Tj Mobley,    Your symptoms show that you may have coronavirus (COVID-19). This illness can cause fever, cough and trouble breathing. Many people get a mild case and get better on their own. Some people can get very sick.    Will I be tested for COVID-19?  We would like to test you for Covid-19 virus. I have placed orders for this test.     To schedule: go to your [facility] home page and scroll down to the section that says  You have an appointment that needs to be scheduled  and click the large green button that says  Schedule Now  and follow the steps to find the next available openings.    If you are unable to complete these [facility] scheduling steps, please call [phone] to schedule your testing.     Return to work/school/ guidance:  Please let your workplace manager and staffing office know when your quarantine ends     We can t give you an exact date as it depends on the above. You can calculate this on your own or work with your manager/staffing office to calculate this. (For example if you were exposed on 10/4, you would have to quarantine for 14 full days. That would be through 10/18. You could return on 10/19.)      If you receive a positive COVID-19 test result, follow the guidance of the those who are giving you the results. Usually the return to work is 10 (or in some cases 20 days from symptom onset.) If you work at [facility], you must also be cleared by Employee Occupational Health and Safety to return to work.        If you receive a negative COVID-19 test result and did not have a high risk exposure to someone with a known positive COVID-19 test, you can return to work once you're free of fever for 24 hours without fever-reducing medication and your symptoms are improving or resolved.      If you receive a negative COVID-19 test and If you had a high risk exposure to someone who has tested positive for COVID-19 then you can return to work 14 days after your last contact  with the positive individual    Note: If you have ongoing exposure to the covid positive person, this quarantine period may be more than 14 days. (For example, if you are continued to be exposed to your child who tested positive and cannot isolate from them, then the quarantine of 7-14 days can't start until your child is no longer contagious. This is typically 10 days from onset of the child's symptoms. So the total duration may be 17-24 days in this case.)    Sign up for [employer].   We know it's scary to hear that you might have COVID-19. We want to track your symptoms to make sure you're okay over the next 2 weeks. Please look for an email from [employer]--this is a free, online program that we'll use to keep in touch. To sign up, follow the link in the email you will receive. Learn more at [email or web address]    How can I take care of myself?    Get lots of rest. Drink extra fluids (unless a doctor has told you not to)    Take Tylenol (acetaminophen) or ibuprofen for fever or pain. If you have liver or kidney problems, ask your family doctor if it's okay to take Tylenol o ibuprofen    If you have other health problems (like cancer, heart failure, an organ transplant or severe kidney disease): Call your specialty clinic if you don't feel better in the next 2 days.    Know when to call 911. Emergency warning signs include:  o Trouble breathing or shortness of breath  o Pain or pressure in the chest that doesn't go away  o Feeling confused like you haven't felt before, or not being able to wake up  o Bluish-colored lips or face    Where can I get more information?  [employer] [place] - About COVID-19:   [email or web address]    CDC - What to Do If You're Sick:   www.cdc.gov/coronavirus/2019-ncov/about/steps-when-sick.html

## 2021-03-24 DIAGNOSIS — Z20.822 SUSPECTED COVID-19 VIRUS INFECTION: ICD-10-CM

## 2021-03-24 LAB
LABORATORY COMMENT REPORT: NORMAL
SARS-COV-2 RNA RESP QL NAA+PROBE: NEGATIVE
SARS-COV-2 RNA RESP QL NAA+PROBE: NORMAL
SPECIMEN SOURCE: NORMAL
SPECIMEN SOURCE: NORMAL

## 2021-03-24 PROCEDURE — U0005 INFEC AGEN DETEC AMPLI PROBE: HCPCS | Performed by: PREVENTIVE MEDICINE

## 2021-03-24 PROCEDURE — U0003 INFECTIOUS AGENT DETECTION BY NUCLEIC ACID (DNA OR RNA); SEVERE ACUTE RESPIRATORY SYNDROME CORONAVIRUS 2 (SARS-COV-2) (CORONAVIRUS DISEASE [COVID-19]), AMPLIFIED PROBE TECHNIQUE, MAKING USE OF HIGH THROUGHPUT TECHNOLOGIES AS DESCRIBED BY CMS-2020-01-R: HCPCS | Performed by: PREVENTIVE MEDICINE

## 2021-04-18 ENCOUNTER — HEALTH MAINTENANCE LETTER (OUTPATIENT)
Age: 26
End: 2021-04-18

## 2021-06-09 ENCOUNTER — ANCILLARY PROCEDURE (OUTPATIENT)
Dept: GENERAL RADIOLOGY | Facility: CLINIC | Age: 26
End: 2021-06-09
Attending: FAMILY MEDICINE
Payer: COMMERCIAL

## 2021-06-09 ENCOUNTER — OFFICE VISIT (OUTPATIENT)
Dept: PODIATRY | Facility: CLINIC | Age: 26
End: 2021-06-09
Attending: FAMILY MEDICINE
Payer: COMMERCIAL

## 2021-06-09 ENCOUNTER — OFFICE VISIT (OUTPATIENT)
Dept: URGENT CARE | Facility: URGENT CARE | Age: 26
End: 2021-06-09
Payer: COMMERCIAL

## 2021-06-09 VITALS — WEIGHT: 261 LBS | BODY MASS INDEX: 35.4 KG/M2

## 2021-06-09 VITALS
DIASTOLIC BLOOD PRESSURE: 81 MMHG | BODY MASS INDEX: 35.51 KG/M2 | SYSTOLIC BLOOD PRESSURE: 148 MMHG | HEART RATE: 76 BPM | OXYGEN SATURATION: 99 % | RESPIRATION RATE: 16 BRPM | TEMPERATURE: 98.6 F | WEIGHT: 261.8 LBS

## 2021-06-09 DIAGNOSIS — S92.354A NONDISPLACED FRACTURE OF FIFTH METATARSAL BONE, RIGHT FOOT, INITIAL ENCOUNTER FOR CLOSED FRACTURE: ICD-10-CM

## 2021-06-09 DIAGNOSIS — S99.921A FOOT INJURY, RIGHT, INITIAL ENCOUNTER: Primary | ICD-10-CM

## 2021-06-09 PROCEDURE — 73630 X-RAY EXAM OF FOOT: CPT | Mod: RT | Performed by: RADIOLOGY

## 2021-06-09 PROCEDURE — 28470 CLTX METATARSAL FX WO MNP EA: CPT | Performed by: PODIATRIST

## 2021-06-09 PROCEDURE — 99213 OFFICE O/P EST LOW 20 MIN: CPT | Performed by: FAMILY MEDICINE

## 2021-06-09 PROCEDURE — 99207 PR NO CHARGE LOS: CPT | Mod: 25 | Performed by: PODIATRIST

## 2021-06-09 ASSESSMENT — PAIN SCALES - GENERAL: PAINLEVEL: EXTREME PAIN (8)

## 2021-06-09 NOTE — PATIENT INSTRUCTIONS
Patient Education     Understanding Fifth Metatarsal Fracture    A fifth metatarsal fracture is a type of broken bone in your foot. You have 5 metatarsals. They are the middle bones in your feet, between your toes and your ankle bones (tarsals). The fifth metatarsal connects your smallest toe to your ankle. These bones help with arch support and balance.   How to say it   [id number]  What causes a fifth metatarsal fracture?   A direct blow to the bone is often the cause of a fracture of the fifth metatarsal. That may happen if you drop a heavy object on your foot or land wrong on your foot or ankle. Twisting activities can also break the bone. Pivoting while playing basketball is one example.   Repeatedly placing too much stress on the bone can also cause a fracture of the fifth metatarsal. This is called a stress fracture. People who do physical activities like dancing or running tend to be more prone to stress fractures.   Symptoms of a fifth metatarsal fracture   Sudden pain along the outside of your foot is the main symptom. A stress fracture may develop more slowly. You may feel chronic pain for a period of time. Your foot may also swell up and bruise. You may have trouble walking.   Treatment for a fifth metatarsal fracture   Treatment for this type of fracture depends on where the bone is broken and how severe the break is. Healing can take up to several months. Treatment may include:     Cold therapy. Putting ice on the area may reduce swelling and pain, especially in the first few days after injury.    Elevation. Propping up the foot so it's above the level of your heart may ease swelling.    Prescription or over-the-counter pain medicines. These help reduce pain and swelling. Talk with your healthcare provider before taking any.    Immobilization. Devices such as a splint, cast, or walking boot can protect the bone and ease pain. They can help keep the bone in place so it heals properly. You may  need to avoid putting any weight on the broken bone for a period of time. Severe fractures usually need a longer limit on weight-bearing activities.    Stretching and strengthening exercises. Certain exercises can help you regain flexibility and strength in your foot.    Surgery. You usually won't need surgery. But you may need it if the bone is broken into 2 or more pieces and is not aligned (displaced), doesn t heal properly, or takes a long time to heal.  Possible complications of a fifth metatarsal fracture     The bone doesn t heal correctly    Acute compartment syndrome. This is when pressure builds up in the muscles of the foot and affects blood flow.    When to call your healthcare provider  Call your healthcare provider right away if you have any of these:     Fever of 100.4 F (38 C) or higher, or as directed by your provider    Chills    Symptoms that don t get better, or get worse    Numbness or coldness in your foot    Toe nails that turn blue or grey in color    New symptoms  Ismael last reviewed this educational content on 8/1/2020 2000-2021 [employer]. All rights reserved. This information is not intended as a substitute for professional medical care. Always follow your healthcare professional's instructions.

## 2021-06-09 NOTE — NURSING NOTE
SMOKING CESSATION  What's in cigarette smoke? - Cigarette smoke contains over 4,000 chemicals. Nicotine is one of the main ingredients which is an insecticide/herbicide. It is poisonous to our nervous system, increases blood clotting risk, and decreases the body's defenses to fight off infection. Another chemical is Carbon Monoxide is an asphyxiating gas that permanently binds to blood cells and blocks the transport of oxygen. This leads to tissue death and decreases your metabolism. Tar is a chemical that coats your lungs and trachea which impairs new oxygen coming in and carbon dioxide getting out of your body.   How does smoking impact surgery? - Smoking is particularly hazardous with regards to surgery. Surgery puts stress on the body and a smoker's body is already under strain from these chemicals. Putting the two together, especially for an elective surgery, could be a recipe for disaster. Smoking before and after surgery increases your risk of heart problems, slow wound healing, delayed bone healing, blood clots, wound infection and anesthesia complications.   What are the benefits of quitting? - In 20 minutes your blood pressure will drop back down to normal. In 8 hours the carbon monoxide (a toxic gas) levels in your blood stream will drop by half, and oxygen levels will return to normal. In 48 hours your chance of having a heart attack will have decreased. All nicotine will have left your body. Your sense of taste and smell will return to a normal level. In 72 hours your bronchial tubes will relax, and your energy levels will increase. In 2 weeks your circulation will increase, and it will continue to improve for the next 10 weeks.    Recommendations for elective surgery - Ideally, patients should quit smoking 8 weeks before and at least 2 weeks after elective surgery in order to avoid complications. Simply cutting back on the amount of cigarettes smoked per day does not offer any benefit or decrease the  risk of poor wound healing, heart problems, and infection. Smokers should also start taking Vitamin C and B for two weeks before surgery and two weeks after surgery.    Ways to Stop Smokin. Nicotine patches, lozenges, or gum  2. Support Groups  3. Medications (see below)    List of Medications:  1. Varenicline Tartrate (CHANTIX)   2. Bupropion HCL (WELLBUTRIN, ZYBAN) - note: make sure Wellbutrin is for smoking cessation and not other issues   3. Nicotine Patch (NICODERM)   4. Nicotine Inhaler (NICOTROL)   5. Nicotine Gum Nicotine Polacrilex   6. Nicotine Lozenge: Nicotine Polacrilex (COMMIT)   * [place] offers a smoking support group as well!  Please visit: [email or web address]  If you are interested in these, ask about getting a prescription or talk to your primary care doctor about what may be the best way for you to quit.

## 2021-06-09 NOTE — PATIENT INSTRUCTIONS
We wish you continued good healing. If you have any questions or concerns, please do not hesitate to contact us at [phone]    "[employer]t (secure e-mail communication and access to your chart) to send a message or to make an appointment.    Please remember to call and schedule a follow up appointment if one was recommended at your earliest convenience.     +++OF MARCH 2020+++ LOCATION AND HOURS HAVE CHANGED    PLEASE CALL CLINICS TO VERIFY DAYS AND TIMES  PODIATRY CLINIC HOURS  TELEPHONE NUMBER    Dr. Shiva RIDLEYPVIC [facility]        Clinics:  [facility]   Tuesday 1PM-6PM  [place]  Wednesday 745AM-330PM  [place]  Thursday/Friday 745AM-230PM  [place] [facility] APPOINTMENTS  [phone]    [place] APPOINTMENTS  [phone]          If you need a medication refill, please contact us you may need lab work and/or a follow up visit prior to your refill (i.e. Antifungal medications).    If MRI needed please call Imaging at [phone] or [phone]    HOW DO I GET MY KNEE SCOOTER? Knee scooters can be picked up at ANY Medical Supply stores with your knee scooter Prescription.  OR    Bring your signed prescription to an [facility] showroom.

## 2021-06-09 NOTE — LETTER
6/9/2021         RE: Tj Mobley  [address] [place] [address]        Dear Colleague,    Thank you for referring your patient, Tj Mobley, to the [facility]. Please see a copy of my visit note below.    Subjective:    Pt is seen today in consult from Dr. Diaz for 5th met fx right foot.  Had inversion type sprain 2 weeks ago.  Had swelling, pain, and ecchymosis.  Aggravated by activity and relieved by rest.   Patient continue to work on his feet and work out.  Pain recently became worse.  He had x-rays and diagnosed with 1/5 metatarsal fracture.  Patient given Aircast and crutches.  He is a smoker.      ROS:  A 10-point review of systems was performed and is positive for that noted in the HPI and as seen above.  All other areas are negative.          Allergies   Allergen Reactions     Penicillins      Sulfa Drugs        Current Outpatient Medications   Medication Sig Dispense Refill     albuterol (PROAIR HFA/PROVENTIL HFA/VENTOLIN HFA) 108 (90 Base) MCG/ACT inhaler Inhale 2 puffs into the lungs every 4 hours as needed for asthma symptoms. 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    No family history on file.    Social History     Tobacco Use     Smoking status: Current Every Day Smoker     Packs/day: 1.00     Types: Cigarettes     Smokeless tobacco: Never Used   Substance Use Topics     Alcohol use: No         Exam:    Vitals: Wt 118.4 kg (261 lb)   BMI 35.40 kg/m    BMI: Body  mass index is 35.4 kg/m .  Height: Data Unavailable    Constitutional/ general:  Pt is in no apparent distress, appears well-nourished.  Cooperative with history and physical exam.     Psych:  The patient answered questions appropriately.  Normal affect.  Seems to have reasonable expectations, in terms of treatment.     Eyes:  Visual scanning/ tracking without deficit.     Ears:  Response to auditory stimuli is normal.  negative hearing aid devices.  Auricles in proper alignment.     Lymphatic:  Popliteal lymph nodes not enlarged.     Lungs:  Non labored breathing, non labored speech. No cough.  No audible wheezing. Even, quiet breathing.       Vascular:  positive pedal pulses bilaterally for both the DP and PT arteries.  CFT < 3 sec.  negative ankle edema.  positive pedal hair growth.    Neuro:  Alert and oriented x 3. Coordinated gait.  Light touch sensation is intact to the L4, L5, S1 distributions. No obvious deficits.  No evidence of neurological-based weakness, spasticity, or contracture in the lower extremities.      Derm: Normal texture and turgor.  No erythema, ecchymosis, or cyanosis.      Musculoskeletal:    Lower extremity muscle strength is normal.  Patient is ambulatory without an assistive device or brace.  No gross deformities.  Normal arch.       Decent muscle strength and ROM to all areas tested.  Pain upon palpation to base of right 5th metatarsal.  Edema  noted.  No erythema or ecchymosis.  No sign of ulceration, infection, or drainage.     Radiographic Exam:  X-Ray Findings:  I personally reviewed the films.    FOOT RIGHT THREE OR MORE VIEWS   6/9/2021 1:02 PM      HISTORY: Two weeks ago inversion injury falling off a step on stairs.  Still hurting proximal right 5th metatarsal. Foot injury, right,  initial encounter.     COMPARISON: None.                                                                      IMPRESSION: Mild to moderately displaced avulsion type fracture  proximal tip of the 5th  metatarsal.      Assessment:  Fifth metatarsal styloid process avulsion fracture right foot     Plan:  X-rays personally reviewed.  Discussed etiology and treatment options with the patient in detail.  Continued nonweightbearing at all times.  Patient may take off her cast and do gentle range of motion at ankle and massage calf to prevent blood clot and we instructed him on doing this several times a day..  If displacement or signs of slow healing pt may require surgery. F/U 3 wks for repeat x-ray.  Fracture care.  Thank you for allowing me participate in the care of this patient.        Shiva Toure DPM, FACFAS        Again, thank you for allowing me to participate in the care of your patient.        Sincerely,        Shiva Toure DPM

## 2021-06-09 NOTE — PROGRESS NOTES
SUBJECTIVE:  Tj Mobley, a 25 year old male scheduled an appointment to discuss the following issues:     Foot injury, right, initial encounter  Nondisplaced fracture of fifth metatarsal bone, right foot, initial encounter for closed fracture    Medical, social, surgical, and family histories reviewed.     Foot Injury (Patient rolled foot 2 weeks ago pain in the outer side of the right foot- and in middle of the bottom- pain is 8/10 with morvement)  2 weeks ago inversion injury falling off a step on stairs; still hurting proximal right 5th metatarsal.  No open wound or paresthesia.  Pt works in carpet and upholstrey cleaning.  Been limping all day today.  No head/neck/trunk injuries.         ROS:  See HPI.  No nausea/vomiting.  No fever/chills.  No chest pain/SOB.  No BM/urine problems.  No syncope.      OBJECTIVE:  BP (!) 148/81   Pulse 76   Temp 98.6  F (37  C) (Tympanic)   Resp 16   Wt 118.8 kg (261 lb 12.8 oz)   SpO2 99%   BMI 35.51 kg/m    EXAM:  GENERAL APPEARANCE:  alert and mild distress  HEENT: normal   NECK: supple  RESP: lungs clear to auscultation - no rales, rhonchi or wheezes  CV: regular rates and rhythm, normal S1 S2, no S3 or S4 and no murmur, click or rub  MS: extremities --- no gross deformities noted; some swelling and tenderness at proximal right 5th metatarsal; able to weight bear but limp while walking; normal DP/PT pulses  SKIN: no suspicious lesions or rashes  NEURO: Normal strength and tone, mentation intact and speech normal      ASSESSMENT/PLAN:  (W42.189A) Foot injury, right, initial encounter  (primary encounter diagnosis)  Comment: Xray right foot showed Mild to moderately displaced avulsion type fracture proximal tip of the 5th metatarsal.  Plan: XR Foot Right G/E 3 Views      (U99.031Z) Nondisplaced fracture of fifth metatarsal bone, right foot, initial encounter for closed fracture  Plan: Ankle/Foot Bracing Supplies Order for DME -         ONLY FOR DME, Orthopedic & Spine           Referral    Pt to f/up orthopedics within 1 week, be seen sooner if new problems arise or worsening.  Warning signs and symptoms explained.

## 2021-06-09 NOTE — LETTER
[facility]  [id number] [place] [address]  Phone: [phone]    June 9, 2021        Tj Mobley  [address] [facility] [place] [address]          To whom it may concern:    RE: Tj Mobley      Patient was seen and treated today at our clinic and missed work.    NO WORK FOR 3  WEEKS 06/09/21-6/3021      Please contact me for questions or concerns.      Sincerely,        Dr. Shiva RIDLEYPVIC FAC FAS/lld

## 2021-06-09 NOTE — LETTER
[facility] [address] [address]  Phone: [phone]    June 9, 2021      RE:  Tj Mobley  [address] [address]          To whom it may concern:    RE: Tj Mobley    Patient was seen and treated today at our clinic and missed work.    Please excuse Tj from work until cleared by orthopedics due to right foot fracture.  Thank you.      Sincerely,        Jordan Diaz MD

## 2021-06-10 ENCOUNTER — TELEPHONE (OUTPATIENT)
Dept: PODIATRY | Facility: CLINIC | Age: 26
End: 2021-06-10

## 2021-06-10 NOTE — PROGRESS NOTES
Subjective:    Pt is seen today in consult from Dr. Diaz for 5th met fx right foot.  Had inversion type sprain 2 weeks ago.  Had swelling, pain, and ecchymosis.  Aggravated by activity and relieved by rest.   Patient continue to work on his feet and work out.  Pain recently became worse.  He had x-rays and diagnosed with 1/5 metatarsal fracture.  Patient given Aircast and crutches.  He is a smoker.      ROS:  A 10-point review of systems was performed and is positive for that noted in the HPI and as seen above.  All other areas are negative.          Allergies   Allergen Reactions     Penicillins      Sulfa Drugs        Current Outpatient Medications   Medication Sig Dispense Refill     albuterol (PROAIR HFA/PROVENTIL HFA/VENTOLIN HFA) 108 (90 Base) MCG/ACT inhaler Inhale 2 puffs into the lungs every 4 hours as needed for asthma symptoms. 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    No family history on file.    Social History     Tobacco Use     Smoking status: Current Every Day Smoker     Packs/day: 1.00     Types: Cigarettes     Smokeless tobacco: Never Used   Substance Use Topics     Alcohol use: No         Exam:    Vitals: Wt 118.4 kg (261 lb)   BMI 35.40 kg/m    BMI: Body mass index is 35.4 kg/m .  Height: Data Unavailable    Constitutional/ general:  Pt is in no apparent distress, appears well-nourished.  Cooperative with history and physical exam.     Psych:  The patient answered questions appropriately.  Normal  affect.  Seems to have reasonable expectations, in terms of treatment.     Eyes:  Visual scanning/ tracking without deficit.     Ears:  Response to auditory stimuli is normal.  negative hearing aid devices.  Auricles in proper alignment.     Lymphatic:  Popliteal lymph nodes not enlarged.     Lungs:  Non labored breathing, non labored speech. No cough.  No audible wheezing. Even, quiet breathing.       Vascular:  positive pedal pulses bilaterally for both the DP and PT arteries.  CFT < 3 sec.  negative ankle edema.  positive pedal hair growth.    Neuro:  Alert and oriented x 3. Coordinated gait.  Light touch sensation is intact to the L4, L5, S1 distributions. No obvious deficits.  No evidence of neurological-based weakness, spasticity, or contracture in the lower extremities.      Derm: Normal texture and turgor.  No erythema, ecchymosis, or cyanosis.      Musculoskeletal:    Lower extremity muscle strength is normal.  Patient is ambulatory without an assistive device or brace.  No gross deformities.  Normal arch.       Decent muscle strength and ROM to all areas tested.  Pain upon palpation to base of right 5th metatarsal.  Edema  noted.  No erythema or ecchymosis.  No sign of ulceration, infection, or drainage.     Radiographic Exam:  X-Ray Findings:  I personally reviewed the films.    FOOT RIGHT THREE OR MORE VIEWS   6/9/2021 1:02 PM      HISTORY: Two weeks ago inversion injury falling off a step on stairs.  Still hurting proximal right 5th metatarsal. Foot injury, right,  initial encounter.     COMPARISON: None.                                                                      IMPRESSION: Mild to moderately displaced avulsion type fracture  proximal tip of the 5th metatarsal.      Assessment:  Fifth metatarsal styloid process avulsion fracture right foot     Plan:  X-rays personally reviewed.  Discussed etiology and treatment options with the patient in detail.  Continued nonweightbearing at all times.   Patient may take off her cast and do gentle range of motion at ankle and massage calf to prevent blood clot and we instructed him on doing this several times a day..  If displacement or signs of slow healing pt may require surgery. F/U 3 wks for repeat x-ray.  Fracture care.  Thank you for allowing me participate in the care of this patient.        Shiva Toure DPM, FACFAS

## 2021-06-10 NOTE — TELEPHONE ENCOUNTER
6/10 Provided phone number [phone] to schedule  in about 3 weeks (around 6/30/2021), or Fracture of Right 5th toe, for Follow up.    Kristin infante Procedure   Orthopedics, Podiatry, Sports Medicine, ENT/Eye Specialties  [facility]   [phone]

## 2021-06-30 ENCOUNTER — OFFICE VISIT (OUTPATIENT)
Dept: PODIATRY | Facility: CLINIC | Age: 26
End: 2021-06-30
Payer: COMMERCIAL

## 2021-06-30 ENCOUNTER — ANCILLARY PROCEDURE (OUTPATIENT)
Dept: GENERAL RADIOLOGY | Facility: CLINIC | Age: 26
End: 2021-06-30
Attending: PODIATRIST
Payer: COMMERCIAL

## 2021-06-30 VITALS — DIASTOLIC BLOOD PRESSURE: 70 MMHG | SYSTOLIC BLOOD PRESSURE: 121 MMHG | HEART RATE: 82 BPM

## 2021-06-30 DIAGNOSIS — S92.354A NONDISPLACED FRACTURE OF FIFTH METATARSAL BONE, RIGHT FOOT, INITIAL ENCOUNTER FOR CLOSED FRACTURE: ICD-10-CM

## 2021-06-30 DIAGNOSIS — S92.354A NONDISPLACED FRACTURE OF FIFTH METATARSAL BONE, RIGHT FOOT, INITIAL ENCOUNTER FOR CLOSED FRACTURE: Primary | ICD-10-CM

## 2021-06-30 PROCEDURE — 73630 X-RAY EXAM OF FOOT: CPT | Mod: RT | Performed by: RADIOLOGY

## 2021-06-30 PROCEDURE — 99207 PR FRACTURE CARE IN GLOBAL PERIOD: CPT | Performed by: PODIATRIST

## 2021-06-30 NOTE — LETTER
6/30/2021         RE: Tj Mobley  [address] [place] [address]        Dear Colleague,    Thank you for referring your patient, Tj Mobley, to the [facility]. Please see a copy of my visit note below.    Subjective:    6/9/21   Pt is seen today in consult from Dr. Diaz for 5th met fx right foot.  Had inversion type sprain 2 weeks ago.  Had swelling, pain, and ecchymosis.  Aggravated by activity and relieved by rest.   Patient continue to work on his feet and work out.  Pain recently became worse.  He had x-rays and diagnosed with 1/5 metatarsal fracture.  Patient given Aircast and crutches.  He is a smoker.    6/30/21 patient is approximately 5 weeks status post inversion injury with pain at the base of the right fifth metatarsal.  No pain for 2 weeks now.  Using walking boot.  States edema decreasing.  When asked him if he can remember any injury to this as a youth.  It sounds as though he was playing some higher impact sports such as lacrosse going up but cannot remember any one incident.      ROS:  See above          Allergies   Allergen Reactions     Penicillins      Sulfa Drugs        Current Outpatient Medications   Medication Sig Dispense Refill     albuterol (PROAIR HFA/PROVENTIL HFA/VENTOLIN HFA) 108 (90 Base) MCG/ACT inhaler Inhale 2 puffs into the lungs every 4 hours as needed for asthma symptoms. 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    No family history on  file.    Social History     Tobacco Use     Smoking status: Current Every Day Smoker     Packs/day: 1.00     Types: Cigarettes     Smokeless tobacco: Never Used   Substance Use Topics     Alcohol use: No         Exam:    Vitals: There were no vitals taken for this visit.  BMI: There is no height or weight on file to calculate BMI.  Height: Data Unavailable    Constitutional/ general:  Pt is in no apparent distress, appears well-nourished.  Cooperative with history and physical exam.     Psych:  The patient answered questions appropriately.  Normal affect.  Seems to have reasonable expectations, in terms of treatment.     Eyes:  Visual scanning/ tracking without deficit.     Ears:  Response to auditory stimuli is normal.  negative hearing aid devices.  Auricles in proper alignment.     Lymphatic:  Popliteal lymph nodes not enlarged.     Lungs:  Non labored breathing, non labored speech. No cough.  No audible wheezing. Even, quiet breathing.       Vascular:  positive pedal pulses bilaterally for both the DP and PT arteries.  CFT < 3 sec.  negative ankle edema.  positive pedal hair growth.    Neuro:  Alert and oriented x 3. Coordinated gait.  Light touch sensation is intact to the L4, L5, S1 distributions. No obvious deficits.  No evidence of neurological-based weakness, spasticity, or contracture in the lower extremities.      Derm: Normal texture and turgor.  No erythema, ecchymosis, or cyanosis.      Musculoskeletal:    Lower extremity muscle strength is normal.  Patient is ambulatory without an assistive device or brace.  No gross deformities.  Normal arch.       Decent muscle strength and ROM to all areas tested.  Today only very slight pain upon palpation to base of right 5th metatarsal.  Edema slight.  No erythema or ecchymosis.  No sign of ulceration, infection, or drainage.  Just slight pain with stressing peroneus brevis.    Radiographic Exam: Avulsion fracture unchanged.  We do note that some of the edges of  this are quite rounded.      Assessment:  Fifth metatarsal styloid process avulsion fracture right foot     Plan:  X-rays taken today of right foot.  Discussed with the patient that portions of this fracture are quite rounded and I am wondering if this is rather an accessory bone or ununited bone from past trauma.  We will continue current work restrictions with seated work only.  We are going to give him an ankle brace today.  He will slowly graduate into this.  If any pain or swelling back into the cam walker again for 5 days and then will try using the ankle brace again.  If no pain or swelling he will slowly walk more more.  We warned him not to over do it or to reinjure this.  Return to the clinic in 3 weeks for reevaluation.  Fracture care.        Shiva Toure DPM, FACFAS        Again, thank you for allowing me to participate in the care of your patient.        Sincerely,        Shiva Toure DPM

## 2021-06-30 NOTE — LETTER
[facility]  [id number] [place] [address]  Phone: [phone]    June 30, 2021        Tj Mobley  [address] [address]          To whom it may concern:    RE: Tj Mobley    Patient was seen and treated today at our clinic.  Patient may return to work  with the following:  Light duty-Desk Duty  When the patient returns to work, the following restrictions apply until 7/14/21:      Please contact me for questions or concerns.      Sincerely,        Dr. Shiva Toure D.P.M FAC FAS/lld

## 2021-06-30 NOTE — PROGRESS NOTES
Subjective:    6/9/21   Pt is seen today in consult from Dr. Diaz for 5th met fx right foot.  Had inversion type sprain 2 weeks ago.  Had swelling, pain, and ecchymosis.  Aggravated by activity and relieved by rest.   Patient continue to work on his feet and work out.  Pain recently became worse.  He had x-rays and diagnosed with 1/5 metatarsal fracture.  Patient given Aircast and crutches.  He is a smoker.    6/30/21 patient is approximately 5 weeks status post inversion injury with pain at the base of the right fifth metatarsal.  No pain for 2 weeks now.  Using walking boot.  States edema decreasing.  When asked him if he can remember any injury to this as a youth.  It sounds as though he was playing some higher impact sports such as lacrosse going up but cannot remember any one incident.      ROS:  See above          Allergies   Allergen Reactions     Penicillins      Sulfa Drugs        Current Outpatient Medications   Medication Sig Dispense Refill     albuterol (PROAIR HFA/PROVENTIL HFA/VENTOLIN HFA) 108 (90 Base) MCG/ACT inhaler Inhale 2 puffs into the lungs every 4 hours as needed for asthma symptoms. 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    No family history on file.    Social History     Tobacco Use     Smoking status: Current Every Day Smoker     Packs/day: 1.00     Types: Cigarettes     Smokeless tobacco: Never Used   Substance Use Topics     Alcohol use: No         Exam:    Vitals: There were no vitals taken  for this visit.  BMI: There is no height or weight on file to calculate BMI.  Height: Data Unavailable    Constitutional/ general:  Pt is in no apparent distress, appears well-nourished.  Cooperative with history and physical exam.     Psych:  The patient answered questions appropriately.  Normal affect.  Seems to have reasonable expectations, in terms of treatment.     Eyes:  Visual scanning/ tracking without deficit.     Ears:  Response to auditory stimuli is normal.  negative hearing aid devices.  Auricles in proper alignment.     Lymphatic:  Popliteal lymph nodes not enlarged.     Lungs:  Non labored breathing, non labored speech. No cough.  No audible wheezing. Even, quiet breathing.       Vascular:  positive pedal pulses bilaterally for both the DP and PT arteries.  CFT < 3 sec.  negative ankle edema.  positive pedal hair growth.    Neuro:  Alert and oriented x 3. Coordinated gait.  Light touch sensation is intact to the L4, L5, S1 distributions. No obvious deficits.  No evidence of neurological-based weakness, spasticity, or contracture in the lower extremities.      Derm: Normal texture and turgor.  No erythema, ecchymosis, or cyanosis.      Musculoskeletal:    Lower extremity muscle strength is normal.  Patient is ambulatory without an assistive device or brace.  No gross deformities.  Normal arch.       Decent muscle strength and ROM to all areas tested.  Today only very slight pain upon palpation to base of right 5th metatarsal.  Edema slight.  No erythema or ecchymosis.  No sign of ulceration, infection, or drainage.  Just slight pain with stressing peroneus brevis.    Radiographic Exam: Avulsion fracture unchanged.  We do note that some of the edges of this are quite rounded.      Assessment:  Fifth metatarsal styloid process avulsion fracture right foot     Plan:  X-rays taken today of right foot.  Discussed with the patient that portions of this fracture are quite rounded and I am wondering if this is  rather an accessory bone or ununited bone from past trauma.  We will continue current work restrictions with seated work only.  We are going to give him an ankle brace today.  He will slowly graduate into this.  If any pain or swelling back into the cam walker again for 5 days and then will try using the ankle brace again.  If no pain or swelling he will slowly walk more more.  We warned him not to over do it or to reinjure this.  Return to the clinic in 3 weeks for reevaluation.  Fracture care.        Shiva Toure, DAVIS, FACFAS

## 2021-07-14 ENCOUNTER — OFFICE VISIT (OUTPATIENT)
Dept: PODIATRY | Facility: CLINIC | Age: 26
End: 2021-07-14
Payer: COMMERCIAL

## 2021-07-14 ENCOUNTER — ANCILLARY PROCEDURE (OUTPATIENT)
Dept: GENERAL RADIOLOGY | Facility: CLINIC | Age: 26
End: 2021-07-14
Attending: PODIATRIST
Payer: COMMERCIAL

## 2021-07-14 VITALS — SYSTOLIC BLOOD PRESSURE: 140 MMHG | DIASTOLIC BLOOD PRESSURE: 82 MMHG | HEART RATE: 76 BPM

## 2021-07-14 DIAGNOSIS — S92.354A NONDISPLACED FRACTURE OF FIFTH METATARSAL BONE, RIGHT FOOT, INITIAL ENCOUNTER FOR CLOSED FRACTURE: Primary | ICD-10-CM

## 2021-07-14 DIAGNOSIS — S92.354A NONDISPLACED FRACTURE OF FIFTH METATARSAL BONE, RIGHT FOOT, INITIAL ENCOUNTER FOR CLOSED FRACTURE: ICD-10-CM

## 2021-07-14 PROCEDURE — 73630 X-RAY EXAM OF FOOT: CPT | Mod: RT | Performed by: RADIOLOGY

## 2021-07-14 PROCEDURE — 99207 PR FRACTURE CARE IN GLOBAL PERIOD: CPT | Performed by: PODIATRIST

## 2021-07-14 NOTE — LETTER
[facility]  [id number] [place] [address]  Phone: [phone]    July 14, 2021        Tj Mobley  [address] [address]          To whom it may concern:    RE: Tj Mobley    Patient was seen and treated today at our clinic.    When the patient returns to work, SEATED WORK ONLY  the following restrictions apply until 3 WEEKS:      07/14/21-8/4/21      Please contact me for questions or concerns.      Sincerely,        Dr. Shiva Toure D.P.M FAC FAS./PRASHANT

## 2021-07-14 NOTE — LETTER
7/14/2021         RE: Tj Mobley  [address] [place] [address]        Dear Colleague,    Thank you for referring your patient, Tj Mobley, to the [facility]. Please see a copy of my visit note below.    Subjective:    6/9/21   Pt is seen today in consult from Dr. Diaz for 5th met fx right foot.  Had inversion type sprain 2 weeks ago.  Had swelling, pain, and ecchymosis.  Aggravated by activity and relieved by rest.   Patient continue to work on his feet and work out.  Pain recently became worse.  He had x-rays and diagnosed with 1/5 metatarsal fracture.  Patient given Aircast and crutches.  He is a smoker.    6/30/21 patient is approximately 5 weeks status post inversion injury with pain at the base of the right fifth metatarsal.  No pain for 2 weeks now.  Using walking boot.  States edema decreasing.  When asked him if he can remember any injury to this as a youth.  It sounds as though he was playing some higher impact sports such as lacrosse going up but cannot remember any one incident.    7/14/21   patient returns for evaluation of right fifth metatarsal base injury.  Patient injured this 6 weeks ago and has been using walking boot for 5 weeks now.  States that was feeling better and he tried wearing ankle brace.  Had pain and swelling.  He went back to walking boot and now feeling much better.  Minimal pain or swelling now.  He is a smoker.  He works as a .          ROS:  See above          Allergies   Allergen Reactions     Penicillins      Sulfa Drugs        Current Outpatient Medications   Medication Sig Dispense Refill     albuterol (PROAIR HFA/PROVENTIL HFA/VENTOLIN HFA) 108 (90 Base) MCG/ACT inhaler Inhale 2 puffs into the lungs every 4 hours as needed for asthma symptoms. 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    No family history on file.    Social History     Tobacco Use     Smoking status: Current Every Day Smoker     Packs/day: 1.00     Types: Cigarettes     Smokeless tobacco: Never Used   Substance Use Topics     Alcohol use: No         Exam:    Vitals: There were no vitals taken for this visit.  BMI: There is no height or weight on file to calculate BMI.  Height: Data Unavailable    Constitutional/ general:  Pt is in no apparent distress, appears well-nourished.  Cooperative with history and physical exam.     Psych:  The patient answered questions appropriately.  Normal affect.  Seems to have reasonable expectations, in terms of treatment.     Eyes:  Visual scanning/ tracking without deficit.     Ears:  Response to auditory stimuli is normal.  negative hearing aid devices.  Auricles in proper alignment.     Lymphatic:  Popliteal lymph nodes not enlarged.     Lungs:  Non labored breathing, non labored speech. No cough.  No audible wheezing. Even, quiet breathing.       Vascular:  positive pedal pulses bilaterally for both the DP and PT arteries.  CFT < 3 sec.  negative ankle edema.  positive pedal hair growth.    Neuro:  Alert and oriented x 3. Coordinated gait.  Light touch sensation is intact to the L4, L5, S1 distributions. No obvious deficits.  No evidence of neurological-based weakness, spasticity, or contracture in the lower extremities.      Derm: Normal texture and turgor.  No erythema, ecchymosis, or cyanosis.      Musculoskeletal:    Lower extremity muscle strength is normal.  Patient is ambulatory without an assistive device or brace.   No gross deformities.  Normal arch.       Decent muscle strength and ROM to all areas tested.  Today only very slight pain upon palpation to base of right 5th metatarsal.  Edema slight.  No erythema or ecchymosis.  No sign of ulceration, infection, or drainage.  Just slight pain with stressing peroneus brevis.    Radiographic Exam: Avulsion fracture versus accessory bone.  Perhaps some healing noted.      Assessment:  Fifth metatarsal styloid process avulsion fracture right foot     Plan:  X-rays taken today of right foot.  Discussed with the patient that again may be accessory bone irritated or possible avulsion.  He states it is feeling better now than at any past time in his recovery.  He has been using the boot for 5 weeks.  He will use this for approximately 1 more week and then try to graduate into ankle brace again.  If pain with ankle brace he will go back to the boot for several days and then try again.  He will slowly increase activities as tolerated.  He will continue seated work only.  Return to the clinic in 3 weeks for reevaluation and repeat x-ray.  Fracture care.        Shiva Toure DPM, FACFAS        Again, thank you for allowing me to participate in the care of your patient.        Sincerely,        Shiva Toure DPM

## 2021-07-14 NOTE — PROGRESS NOTES
Subjective:    6/9/21   Pt is seen today in consult from Dr. Diaz for 5th met fx right foot.  Had inversion type sprain 2 weeks ago.  Had swelling, pain, and ecchymosis.  Aggravated by activity and relieved by rest.   Patient continue to work on his feet and work out.  Pain recently became worse.  He had x-rays and diagnosed with 1/5 metatarsal fracture.  Patient given Aircast and crutches.  He is a smoker.    6/30/21 patient is approximately 5 weeks status post inversion injury with pain at the base of the right fifth metatarsal.  No pain for 2 weeks now.  Using walking boot.  States edema decreasing.  When asked him if he can remember any injury to this as a youth.  It sounds as though he was playing some higher impact sports such as lacrosse going up but cannot remember any one incident.    7/14/21   patient returns for evaluation of right fifth metatarsal base injury.  Patient injured this 6 weeks ago and has been using walking boot for 5 weeks now.  States that was feeling better and he tried wearing ankle brace.  Had pain and swelling.  He went back to walking boot and now feeling much better.  Minimal pain or swelling now.  He is a smoker.  He works as a .          ROS:  See above          Allergies   Allergen Reactions     Penicillins      Sulfa Drugs        Current Outpatient Medications   Medication Sig Dispense Refill     albuterol (PROAIR HFA/PROVENTIL HFA/VENTOLIN HFA) 108 (90 Base) MCG/ACT inhaler Inhale 2 puffs into the lungs every 4 hours as needed for asthma symptoms. 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    No family history on file.    Social History     Tobacco Use     Smoking status: Current Every Day Smoker     Packs/day: 1.00     Types: Cigarettes     Smokeless tobacco: Never Used   Substance Use Topics     Alcohol use: No         Exam:    Vitals: There were no vitals taken for this visit.  BMI: There is no height or weight on file to calculate BMI.  Height: Data Unavailable    Constitutional/ general:  Pt is in no apparent distress, appears well-nourished.  Cooperative with history and physical exam.     Psych:  The patient answered questions appropriately.  Normal affect.  Seems to have reasonable expectations, in terms of treatment.     Eyes:  Visual scanning/ tracking without deficit.     Ears:  Response to auditory stimuli is normal.  negative hearing aid devices.  Auricles in proper alignment.     Lymphatic:  Popliteal lymph nodes not enlarged.     Lungs:  Non labored breathing, non labored speech. No cough.  No audible wheezing. Even, quiet breathing.       Vascular:  positive pedal pulses bilaterally for both the DP and PT arteries.  CFT < 3 sec.  negative ankle edema.  positive pedal hair growth.    Neuro:  Alert and oriented x 3. Coordinated gait.  Light touch sensation is intact to the L4, L5, S1 distributions. No obvious deficits.  No evidence of neurological-based weakness, spasticity, or contracture in the lower extremities.      Derm: Normal texture and turgor.  No erythema, ecchymosis, or cyanosis.      Musculoskeletal:    Lower extremity muscle strength is normal.  Patient is ambulatory without an assistive device or brace.  No gross deformities.  Normal arch.       Decent muscle strength and ROM to all areas tested.  Today only very slight pain upon palpation to base of right 5th metatarsal.  Edema slight.  No erythema or ecchymosis.  No sign of ulceration, infection, or  drainage.  Just slight pain with stressing peroneus brevis.    Radiographic Exam: Avulsion fracture versus accessory bone.  Perhaps some healing noted.      Assessment:  Fifth metatarsal styloid process avulsion fracture right foot     Plan:  X-rays taken today of right foot.  Discussed with the patient that again may be accessory bone irritated or possible avulsion.  He states it is feeling better now than at any past time in his recovery.  He has been using the boot for 5 weeks.  He will use this for approximately 1 more week and then try to graduate into ankle brace again.  If pain with ankle brace he will go back to the boot for several days and then try again.  He will slowly increase activities as tolerated.  He will continue seated work only.  Return to the clinic in 3 weeks for reevaluation and repeat x-ray.  Fracture care.        Shiva Toure DPM, FACFAS

## 2021-07-14 NOTE — PATIENT INSTRUCTIONS
We wish you continued good healing. If you have any questions or concerns, please do not hesitate to contact us at [phone]    [email or web address] (secure e-mail communication and access to your chart) to send a message or to make an appointment.    Please remember to call and schedule a follow up appointment if one was recommended at your earliest convenience.     +++OF MARCH 2020+++ LOCATION AND HOURS HAVE CHANGED    PLEASE CALL CLINICS TO VERIFY DAYS AND TIMES  PODIATRY CLINIC HOURS  TELEPHONE NUMBER    Dr. Shiva RIDLEYPVIC [facility]        Clinics:  [facility]   Tuesday 1PM-6PM  [place]  Wednesday 745AM-330PM  [place]  Thursday/Friday 745AM-230PM  [facility] APPOINTMENTS  [phone]    [place] APPOINTMENTS  [phone]          If you need a medication refill, please contact us you may need lab work and/or a follow up visit prior to your refill (i.e. Antifungal medications).    If MRI needed please call Imaging at [phone] or [phone]    HOW DO I GET MY KNEE SCOOTER? Knee scooters can be picked up at ANY Medical Supply stores with your knee scooter Prescription.  OR    Bring your signed prescription to an [facility] showroom.

## 2021-08-04 ENCOUNTER — ANCILLARY PROCEDURE (OUTPATIENT)
Dept: GENERAL RADIOLOGY | Facility: CLINIC | Age: 26
End: 2021-08-04
Attending: PODIATRIST
Payer: COMMERCIAL

## 2021-08-04 ENCOUNTER — OFFICE VISIT (OUTPATIENT)
Dept: PODIATRY | Facility: CLINIC | Age: 26
End: 2021-08-04
Payer: COMMERCIAL

## 2021-08-04 VITALS — HEART RATE: 88 BPM | DIASTOLIC BLOOD PRESSURE: 80 MMHG | SYSTOLIC BLOOD PRESSURE: 145 MMHG

## 2021-08-04 DIAGNOSIS — S92.354A NONDISPLACED FRACTURE OF FIFTH METATARSAL BONE, RIGHT FOOT, INITIAL ENCOUNTER FOR CLOSED FRACTURE: ICD-10-CM

## 2021-08-04 DIAGNOSIS — S92.354A NONDISPLACED FRACTURE OF FIFTH METATARSAL BONE, RIGHT FOOT, INITIAL ENCOUNTER FOR CLOSED FRACTURE: Primary | ICD-10-CM

## 2021-08-04 PROCEDURE — 99207 PR FRACTURE CARE IN GLOBAL PERIOD: CPT | Performed by: PODIATRIST

## 2021-08-04 PROCEDURE — 73630 X-RAY EXAM OF FOOT: CPT | Mod: RT | Performed by: RADIOLOGY

## 2021-08-04 NOTE — LETTER
8/4/2021         RE: Tj Mobley  [address] [place] [address]        Dear Colleague,    Thank you for referring your patient, Tj Mobley, to the [facility]. Please see a copy of my visit note below.    Subjective:    6/9/21   Pt is seen today in consult from Dr. Diaz for 5th met fx right foot.  Had inversion type sprain 2 weeks ago.  Had swelling, pain, and ecchymosis.  Aggravated by activity and relieved by rest.   Patient continue to work on his feet and work out.  Pain recently became worse.  He had x-rays and diagnosed with 1/5 metatarsal fracture.  Patient given Aircast and crutches.  He is a smoker.    6/30/21 patient is approximately 5 weeks status post inversion injury with pain at the base of the right fifth metatarsal.  No pain for 2 weeks now.  Using walking boot.  States edema decreasing.  When asked him if he can remember any injury to this as a youth.  It sounds as though he was playing some higher impact sports such as lacrosse going up but cannot remember any one incident.    7/14/21   patient returns for evaluation of right fifth metatarsal base injury.  Patient injured this 6 weeks ago and has been using walking boot for 5 weeks now.  States that was feeling better and he tried wearing ankle brace.  Had pain and swelling.  He went back to walking boot and now feeling much better.  Minimal pain or swelling now.  He is a smoker.  He works as a .    8/4/21 patient is approximately 8 weeks postop inversion injury to right foot on or about May 26, 2021.   Patient walking in ankle brace now.  States feeling better and minimal pain.  Played a round of Frisbee golf over the weekend and no discomfort.  He is working on using the ankle brace.  He is a .  Denies ecchymosis or weakness          ROS:  See above          Allergies   Allergen Reactions     Penicillins      Sulfa Drugs        Current Outpatient Medications   Medication  Sig Dispense Refill     albuterol (PROAIR HFA/PROVENTIL HFA/VENTOLIN HFA) 108 (90 Base) MCG/ACT inhaler Inhale 2 puffs into the lungs every 4 hours as needed for asthma symptoms. 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    No family history on file.    Social History     Tobacco Use     Smoking status: Current Every Day Smoker     Packs/day: 1.00     Types: Cigarettes     Smokeless tobacco: Never Used   Substance Use Topics     Alcohol use: No         Exam:    Vitals: There were no vitals taken for this visit.  BMI: There is no height or weight on file to calculate BMI.  Height: Data Unavailable    Constitutional/ general:  Pt is in no apparent distress, appears well-nourished.  Cooperative with history and physical exam.     Psych:  The patient answered questions appropriately.  Normal affect.  Seems to have reasonable expectations, in terms of treatment.     Eyes:  Visual scanning/ tracking without deficit.     Ears:  Response to auditory stimuli is normal.  negative hearing aid devices.  Auricles in proper alignment.     Lymphatic:  Popliteal lymph nodes not enlarged.     Lungs:  Non labored breathing, non labored speech. No cough.  No audible wheezing. Even, quiet breathing.       Vascular:  positive pedal pulses bilaterally for both the DP and PT arteries.  CFT < 3 sec.  negative ankle edema.  positive pedal hair growth.    Neuro:  Alert and oriented x 3. Coordinated gait.  Light touch sensation is intact to the L4, L5,  S1 distributions. No obvious deficits.  No evidence of neurological-based weakness, spasticity, or contracture in the lower extremities.      Derm: Normal texture and turgor.  No erythema, ecchymosis, or cyanosis.      Musculoskeletal:    Lower extremity muscle strength is normal.  Patient is ambulatory without an assistive device or brace.  No gross deformities.  Normal arch.       Decent muscle strength and ROM to all areas tested.  Today no pain upon palpation to base of right 5th metatarsal.  Edema slight.  No erythema or ecchymosis.  No sign of ulceration, infection, or drainage.  Just slight pain with stressing peroneus brevis.    Radiographic Exam: X-ray is unchanged.      Assessment:  Fifth metatarsal styloid process avulsion fracture right foot     Plan:  X-rays taken today of right foot.  Discussed with the patient that the bone is unchanged and discussed this is probably accessory bone.  He is doing much better.  Activities as tolerated.  He will wear the brace for another month.  If no pain he will graduate into compression.  He will then graduate out of this into just a shoe.  He has a new shoe with more lateral support.  He will be careful not to reinjure it.  If still painful discussed surgical removal of bone.  RTC as needed.  Fracture care.        Shiva Toure DPM, FACFAS        Again, thank you for allowing me to participate in the care of your patient.        Sincerely,        Shiva Toure DPM

## 2021-08-04 NOTE — NURSING NOTE
SMOKING CESSATION  What's in cigarette smoke? - Cigarette smoke contains over 4,000 chemicals. Nicotine is one of the main ingredients which is an insecticide/herbicide. It is poisonous to our nervous system, increases blood clotting risk, and decreases the body's defenses to fight off infection. Another chemical is Carbon Monoxide is an asphyxiating gas that permanently binds to blood cells and blocks the transport of oxygen. This leads to tissue death and decreases your metabolism. Tar is a chemical that coats your lungs and trachea which impairs new oxygen coming in and carbon dioxide getting out of your body.   How does smoking impact surgery? - Smoking is particularly hazardous with regards to surgery. Surgery puts stress on the body and a smoker's body is already under strain from these chemicals. Putting the two together, especially for an elective surgery, could be a recipe for disaster. Smoking before and after surgery increases your risk of heart problems, slow wound healing, delayed bone healing, blood clots, wound infection and anesthesia complications.   What are the benefits of quitting? - In 20 minutes your blood pressure will drop back down to normal. In 8 hours the carbon monoxide (a toxic gas) levels in your blood stream will drop by half, and oxygen levels will return to normal. In 48 hours your chance of having a heart attack will have decreased. All nicotine will have left your body. Your sense of taste and smell will return to a normal level. In 72 hours your bronchial tubes will relax, and your energy levels will increase. In 2 weeks your circulation will increase, and it will continue to improve for the next 10 weeks.    Recommendations for elective surgery - Ideally, patients should quit smoking 8 weeks before and at least 2 weeks after elective surgery in order to avoid complications. Simply cutting back on the amount of cigarettes smoked per day does not offer any benefit or decrease the  risk of poor wound healing, heart problems, and infection. Smokers should also start taking Vitamin C and B for two weeks before surgery and two weeks after surgery.    Ways to Stop Smokin. Nicotine patches, lozenges, or gum  2. Support Groups  3. Medications (see below)    List of Medications:  1. Varenicline Tartrate (CHANTIX) (may be discontinued by FDA as of 2021)  2. Bupropion HCL (WELLBUTRIN, ZYBAN) - note: make sure Wellbutrin is for smoking cessation and not other issues   3. Nicotine Patch (NICODERM)   4. Nicotine Inhaler (NICOTROL)   5. Nicotine Gum Nicotine Polacrilex   6. Nicotine Lozenge: Nicotine Polacrilex (COMMIT)   * [employer] offers a smoking support group as well!  Please visit: [email or web address]  If you are interested in these, ask about getting a prescription or talk to your primary care doctor about what may be the best way for you to quit.

## 2021-08-04 NOTE — PROGRESS NOTES
Subjective:    6/9/21   Pt is seen today in consult from Dr. Diaz for 5th met fx right foot.  Had inversion type sprain 2 weeks ago.  Had swelling, pain, and ecchymosis.  Aggravated by activity and relieved by rest.   Patient continue to work on his feet and work out.  Pain recently became worse.  He had x-rays and diagnosed with 1/5 metatarsal fracture.  Patient given Aircast and crutches.  He is a smoker.    6/30/21 patient is approximately 5 weeks status post inversion injury with pain at the base of the right fifth metatarsal.  No pain for 2 weeks now.  Using walking boot.  States edema decreasing.  When asked him if he can remember any injury to this as a youth.  It sounds as though he was playing some higher impact sports such as lacrosse going up but cannot remember any one incident.    7/14/21   patient returns for evaluation of right fifth metatarsal base injury.  Patient injured this 6 weeks ago and has been using walking boot for 5 weeks now.  States that was feeling better and he tried wearing ankle brace.  Had pain and swelling.  He went back to walking boot and now feeling much better.  Minimal pain or swelling now.  He is a smoker.  He works as a .    8/4/21 patient is approximately 8 weeks postop inversion injury to right foot on or about May 26, 2021.   Patient walking in ankle brace now.  States feeling better and minimal pain.  Played a round of Frisbee golf over the weekend and no discomfort.  He is working on using the ankle brace.  He is a .  Denies ecchymosis or weakness          ROS:  See above          Allergies   Allergen Reactions     Penicillins      Sulfa Drugs        Current Outpatient Medications   Medication Sig Dispense Refill     albuterol (PROAIR HFA/PROVENTIL HFA/VENTOLIN HFA) 108 (90 Base) MCG/ACT inhaler Inhale 2 puffs into the lungs every 4 hours as needed for asthma symptoms. 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    No family history on file.    Social History     Tobacco Use     Smoking status: Current Every Day Smoker     Packs/day: 1.00     Types: Cigarettes     Smokeless tobacco: Never Used   Substance Use Topics     Alcohol use: No         Exam:    Vitals: There were no vitals taken for this visit.  BMI: There is no height or weight on file to calculate BMI.  Height: Data Unavailable    Constitutional/ general:  Pt is in no apparent distress, appears well-nourished.  Cooperative with history and physical exam.     Psych:  The patient answered questions appropriately.  Normal affect.  Seems to have reasonable expectations, in terms of treatment.     Eyes:  Visual scanning/ tracking without deficit.     Ears:  Response to auditory stimuli is normal.  negative hearing aid devices.  Auricles in proper alignment.     Lymphatic:  Popliteal lymph nodes not enlarged.     Lungs:  Non labored breathing, non labored speech. No cough.  No audible wheezing. Even, quiet breathing.       Vascular:  positive pedal pulses bilaterally for both the DP and PT arteries.  CFT < 3 sec.  negative ankle edema.  positive pedal hair growth.    Neuro:  Alert and oriented x 3. Coordinated gait.  Light touch sensation is intact to the L4, L5, S1 distributions. No obvious deficits.  No evidence of neurological-based weakness, spasticity, or contracture in the lower extremities.      Derm: Normal texture and turgor.  No erythema, ecchymosis, or cyanosis.      Musculoskeletal:    Lower  extremity muscle strength is normal.  Patient is ambulatory without an assistive device or brace.  No gross deformities.  Normal arch.       Decent muscle strength and ROM to all areas tested.  Today no pain upon palpation to base of right 5th metatarsal.  Edema slight.  No erythema or ecchymosis.  No sign of ulceration, infection, or drainage.  Just slight pain with stressing peroneus brevis.    Radiographic Exam: X-ray is unchanged.      Assessment:  Fifth metatarsal styloid process avulsion fracture right foot     Plan:  X-rays taken today of right foot.  Discussed with the patient that the bone is unchanged and discussed this is probably accessory bone.  He is doing much better.  Activities as tolerated.  He will wear the brace for another month.  If no pain he will graduate into compression.  He will then graduate out of this into just a shoe.  He has a new shoe with more lateral support.  He will be careful not to reinjure it.  If still painful discussed surgical removal of bone.  RTC as needed.  Fracture care.        Shiva Toure DPM, FACFAS     vomiting blood

## 2021-08-04 NOTE — PATIENT INSTRUCTIONS
We wish you continued good healing. If you have any questions or concerns, please do not hesitate to contact us at [phone]    [facility] (secure e-mail communication and access to your chart) to send a message or to make an appointment.    Please remember to call and schedule a follow up appointment if one was recommended at your earliest convenience.     +++OF MARCH 2020+++ LOCATION AND HOURS HAVE CHANGED    PLEASE CALL CLINICS TO VERIFY DAYS AND TIMES  PODIATRY CLINIC HOURS  TELEPHONE NUMBER    Dr. Shiva RIDLEYPVIC [facility]        Clinics:  [facility]   Tuesday 1PM-6PM  [place]  Wednesday 745AM-330PM  [place]  Thursday/Friday 745AM-230PM  [facility] APPOINTMENTS  [phone]    [place] APPOINTMENTS  [phone]          If you need a medication refill, please contact us you may need lab work and/or a follow up visit prior to your refill (i.e. Antifungal medications).    If MRI needed please call Imaging at [phone] or [phone]    HOW DO I GET MY KNEE SCOOTER? Knee scooters can be picked up at ANY Medical Supply stores with your knee scooter Prescription.  OR    Bring your signed prescription to an [facility] showroom.

## 2021-10-02 ENCOUNTER — HEALTH MAINTENANCE LETTER (OUTPATIENT)
Age: 26
End: 2021-10-02

## 2021-10-19 PROBLEM — F32.9 MAJOR DEPRESSION: Status: ACTIVE | Noted: 2018-04-04

## 2022-04-14 ENCOUNTER — E-VISIT (OUTPATIENT)
Dept: URGENT CARE | Facility: CLINIC | Age: 27
End: 2022-04-14
Payer: COMMERCIAL

## 2022-04-14 DIAGNOSIS — R05.9 COUGH: Primary | ICD-10-CM

## 2022-04-14 PROCEDURE — 99207 PR NO CHARGE LOS: CPT | Performed by: FAMILY MEDICINE

## 2022-04-14 NOTE — PATIENT INSTRUCTIONS
Dear Tj Mobley,    We are sorry you are not feeling well. Based on the responses you provided, it is recommended that you be seen in-person in urgent care so we can better evaluate your symptoms. Please click here to find the nearest urgent care location to you.   You will not be charged for this Visit. Thank you for trusting us with your care.    Nida Alegria MD

## 2022-05-14 ENCOUNTER — HEALTH MAINTENANCE LETTER (OUTPATIENT)
Age: 27
End: 2022-05-14

## 2022-09-03 ENCOUNTER — HEALTH MAINTENANCE LETTER (OUTPATIENT)
Age: 27
End: 2022-09-03

## 2022-12-02 ENCOUNTER — E-VISIT (OUTPATIENT)
Dept: URGENT CARE | Facility: URGENT CARE | Age: 27
End: 2022-12-02
Payer: COMMERCIAL

## 2022-12-02 ENCOUNTER — MYC REFILL (OUTPATIENT)
Dept: FAMILY MEDICINE | Facility: CLINIC | Age: 27
End: 2022-12-02

## 2022-12-02 DIAGNOSIS — R06.2 WHEEZING: ICD-10-CM

## 2022-12-02 DIAGNOSIS — R06.02 SOB (SHORTNESS OF BREATH): Primary | ICD-10-CM

## 2022-12-02 DIAGNOSIS — J45.20 INTERMITTENT ASTHMA WITHOUT COMPLICATION, UNSPECIFIED ASTHMA SEVERITY: ICD-10-CM

## 2022-12-02 PROCEDURE — 99207 PR NON-BILLABLE SERV PER CHARTING: CPT | Performed by: NURSE PRACTITIONER

## 2022-12-02 NOTE — LETTER
December 5, 2022      Tj Mobley  [address] [place] [address]        Dear Tj Mobley,      At [facility] we care about your health and are committed to providing quality patient care. Regular appointments are a vital part of the care and management of your health and can help prevent many of the complications that can occur.      It has come to our attention that you are due for an In clinic appointment for a medication check.No more refills will be given until you are seen. Please call [facility] at [phone] soon to schedule your follow up appointment.    If you have transferred care to another clinic please call to inform us so that we do not continue to send you reminder letters.      Sincerely,      [facility] Care Team

## 2022-12-02 NOTE — PATIENT INSTRUCTIONS
Dear Tj Mobley,    We are sorry you are not feeling well. Based on the responses you provided, it is recommended that you be seen in-person in urgent care so we can better evaluate your symptoms. Please click here to find the nearest urgent care location to you.   You will not be charged for this Visit. Thank you for trusting us with your care.    Sincerely,     DANIELLE Martinez CNP

## 2022-12-05 RX ORDER — ALBUTEROL SULFATE 90 UG/1
2 AEROSOL, METERED RESPIRATORY (INHALATION) EVERY 4 HOURS PRN
Qty: 8 G | Refills: 0 | OUTPATIENT
Start: 2022-12-05

## 2022-12-05 NOTE — TELEPHONE ENCOUNTER
Let patient know he needs to be seen in office for further refills. He has had no visit with [facility] primary care since telephone visit with me. (last office visit 2019?)

## 2023-01-20 ENCOUNTER — ANCILLARY PROCEDURE (OUTPATIENT)
Dept: GENERAL RADIOLOGY | Facility: CLINIC | Age: 28
End: 2023-01-20
Attending: PHYSICIAN ASSISTANT
Payer: COMMERCIAL

## 2023-01-20 ENCOUNTER — OFFICE VISIT (OUTPATIENT)
Dept: URGENT CARE | Facility: URGENT CARE | Age: 28
End: 2023-01-20
Payer: COMMERCIAL

## 2023-01-20 VITALS
SYSTOLIC BLOOD PRESSURE: 125 MMHG | TEMPERATURE: 98.3 F | RESPIRATION RATE: 20 BRPM | DIASTOLIC BLOOD PRESSURE: 83 MMHG | WEIGHT: 281.6 LBS | BODY MASS INDEX: 38.19 KG/M2 | OXYGEN SATURATION: 97 % | HEART RATE: 96 BPM

## 2023-01-20 DIAGNOSIS — R93.89 ABNORMAL X-RAY: ICD-10-CM

## 2023-01-20 DIAGNOSIS — M79.672 LEFT FOOT PAIN: Primary | ICD-10-CM

## 2023-01-20 DIAGNOSIS — M79.672 LEFT FOOT PAIN: ICD-10-CM

## 2023-01-20 PROCEDURE — 99214 OFFICE O/P EST MOD 30 MIN: CPT | Performed by: PHYSICIAN ASSISTANT

## 2023-01-20 PROCEDURE — 73630 X-RAY EXAM OF FOOT: CPT | Mod: TC | Performed by: RADIOLOGY

## 2023-01-20 NOTE — PROGRESS NOTES
Chief Complaint   Patient presents with     Foot Pain     Left     Xray- I see ossicle or old fracture. Does not look acute to me    Results for orders placed or performed in visit on 01/20/23   XR Foot Left G/E 3 Views     Status: None (Preliminary result)    Narrative    FOOT LEFT THREE OR MORE VIEWS   1/20/2023 12:06 PM     HISTORY: Tender base of 5th metatarsal, no specific injury; Left foot  pain.    COMPARISON: None.      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 This could be from an old healed fracture or a  developmental variant accessory ossicle/nonunited apophysis. MRI could  help determine whether or not this is symptomatic if clinically  warranted. There is no other abnormality.           ASSESSMENT:    ICD-10-CM    1. Left foot pain  M79.672 XR Foot Left G/E 3 Views     Orthopedic  Referral      2. Abnormal x-ray  R93.89                 PLAN:Abnormal xray- ? Chronic. Referral to podiatry. Likely needs MRI. Ice 2 times daily. Nsaids as needed. Metatarsal padding as needed.    Aimee Zarate PA-C        SUBJECTIVE:  Tj Mobley is an 27 year old male who presents with left lateral foot pain for the past 2 months, on and off, varying degrees of intensity.  Does not recall any specific injury.  No numbness or tingling.  History of prior right fifth metatarsal fracture after an injury.  Wears an ankle brace which does help somewhat.      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      OBJECTIVE:  Blood pressure 125/83, pulse 96, temperature 98.3  F (36.8  C), temperature source Tympanic, resp. rate 20, weight 127.7 kg (281 lb 9.6 oz), SpO2 97 %.  Patient is alert and NAD.  EYES:  conjunctiva clear  Ankle/foot exam (left):  Inspection:no swelling seen  Palpation:tender over 5th metatarsal  Cap refill intact.    Good doralis pedis.  Neurovascularly Intact Distally.         Aimee Zarate PA-C

## 2023-06-03 ENCOUNTER — HEALTH MAINTENANCE LETTER (OUTPATIENT)
Age: 28
End: 2023-06-03

## 2024-07-06 ENCOUNTER — HEALTH MAINTENANCE LETTER (OUTPATIENT)
Age: 29
End: 2024-07-06

## 2025-07-13 ENCOUNTER — HEALTH MAINTENANCE LETTER (OUTPATIENT)
Age: 30
End: 2025-07-13

## 2025-07-23 NOTE — LETTER
[address] [address]  Phone: [phone]    October 5, 2017        Tj IRAHETA [place]  [address] [address]          To whom it may concern:    RE: Tj Mobley    Patient was seen and treated today at our clinic and missed work.    Please contact me for questions or concerns.      Sincerely,      Amber Shaw PAC    Faxed to [phone]